# Patient Record
Sex: FEMALE | Race: WHITE | NOT HISPANIC OR LATINO | Employment: FULL TIME | URBAN - METROPOLITAN AREA
[De-identification: names, ages, dates, MRNs, and addresses within clinical notes are randomized per-mention and may not be internally consistent; named-entity substitution may affect disease eponyms.]

---

## 2023-10-12 ENCOUNTER — ANESTHESIA EVENT (OUTPATIENT)
Dept: RADIOLOGY | Facility: HOSPITAL | Age: 28
End: 2023-10-12
Payer: MEDICAID

## 2023-10-12 ENCOUNTER — ANESTHESIA (OUTPATIENT)
Dept: RADIOLOGY | Facility: HOSPITAL | Age: 28
End: 2023-10-12
Payer: MEDICAID

## 2023-10-12 ENCOUNTER — HOSPITAL ENCOUNTER (OUTPATIENT)
Dept: RADIOLOGY | Facility: HOSPITAL | Age: 28
Discharge: HOME | End: 2023-10-12
Attending: NEUROLOGICAL SURGERY
Payer: MEDICAID

## 2023-10-12 ENCOUNTER — HOSPITAL ENCOUNTER (OUTPATIENT)
Dept: RADIOLOGY | Facility: HOSPITAL | Age: 28
Discharge: HOME | End: 2023-10-12
Payer: MEDICAID

## 2023-10-12 VITALS
SYSTOLIC BLOOD PRESSURE: 117 MMHG | OXYGEN SATURATION: 100 % | TEMPERATURE: 97.9 F | HEART RATE: 77 BPM | DIASTOLIC BLOOD PRESSURE: 66 MMHG | RESPIRATION RATE: 16 BRPM

## 2023-10-12 DIAGNOSIS — G31.9 DEGENERATIVE DISEASE OF NERVOUS SYSTEM, UNSPECIFIED (CMS-HCC): ICD-10-CM

## 2023-10-12 DIAGNOSIS — G24.9 DYSTONIA, UNSPECIFIED: ICD-10-CM

## 2023-10-12 PROBLEM — F41.9 ANXIETY: Status: ACTIVE | Noted: 2023-10-12

## 2023-10-12 PROCEDURE — 70460 CT HEAD/BRAIN W/DYE: CPT

## 2023-10-12 PROCEDURE — 70553 MRI BRAIN STEM W/O & W/DYE: CPT | Performed by: RADIOLOGY

## 2023-10-12 PROCEDURE — 70450 CT HEAD/BRAIN W/O DYE: CPT | Performed by: RADIOLOGY

## 2023-10-12 PROCEDURE — 3700000001 HC GENERAL ANESTHESIA TIME - INITIAL BASE CHARGE

## 2023-10-12 PROCEDURE — 2550000001 HC RX 255 CONTRASTS: Mod: SE | Performed by: NEUROLOGICAL SURGERY

## 2023-10-12 PROCEDURE — A9575 INJ GADOTERATE MEGLUMI 0.1ML: HCPCS | Mod: SE | Performed by: NEUROLOGICAL SURGERY

## 2023-10-12 PROCEDURE — A70551 CHG MRI BRAIN: Performed by: PAIN MEDICINE

## 2023-10-12 PROCEDURE — 2500000004 HC RX 250 GENERAL PHARMACY W/ HCPCS (ALT 636 FOR OP/ED): Mod: SE | Performed by: STUDENT IN AN ORGANIZED HEALTH CARE EDUCATION/TRAINING PROGRAM

## 2023-10-12 PROCEDURE — 2580000001 HC RX 258 IV SOLUTIONS: Mod: SE | Performed by: STUDENT IN AN ORGANIZED HEALTH CARE EDUCATION/TRAINING PROGRAM

## 2023-10-12 PROCEDURE — 3700000002 HC GENERAL ANESTHESIA TIME - EACH INCREMENTAL 1 MINUTE

## 2023-10-12 PROCEDURE — 2500000005 HC RX 250 GENERAL PHARMACY W/O HCPCS: Mod: SE | Performed by: STUDENT IN AN ORGANIZED HEALTH CARE EDUCATION/TRAINING PROGRAM

## 2023-10-12 PROCEDURE — 70553 MRI BRAIN STEM W/O & W/DYE: CPT

## 2023-10-12 RX ORDER — PROPOFOL 10 MG/ML
INJECTION, EMULSION INTRAVENOUS AS NEEDED
Status: DISCONTINUED | OUTPATIENT
Start: 2023-10-12 | End: 2023-10-12

## 2023-10-12 RX ORDER — LIDOCAINE HYDROCHLORIDE 10 MG/ML
0.1 INJECTION, SOLUTION EPIDURAL; INFILTRATION; INTRACAUDAL; PERINEURAL ONCE
Status: CANCELLED | OUTPATIENT
Start: 2023-10-12 | End: 2023-10-12

## 2023-10-12 RX ORDER — SODIUM CHLORIDE, SODIUM LACTATE, POTASSIUM CHLORIDE, CALCIUM CHLORIDE 600; 310; 30; 20 MG/100ML; MG/100ML; MG/100ML; MG/100ML
100 INJECTION, SOLUTION INTRAVENOUS CONTINUOUS
Status: CANCELLED | OUTPATIENT
Start: 2023-10-12

## 2023-10-12 RX ORDER — MIDAZOLAM HYDROCHLORIDE 1 MG/ML
INJECTION INTRAMUSCULAR; INTRAVENOUS
Status: DISCONTINUED
Start: 2023-10-12 | End: 2023-10-12 | Stop reason: HOSPADM

## 2023-10-12 RX ORDER — FENTANYL CITRATE 50 UG/ML
INJECTION, SOLUTION INTRAMUSCULAR; INTRAVENOUS
Status: COMPLETED
Start: 2023-10-12 | End: 2023-10-12

## 2023-10-12 RX ORDER — FENTANYL CITRATE 50 UG/ML
INJECTION, SOLUTION INTRAMUSCULAR; INTRAVENOUS AS NEEDED
Status: DISCONTINUED | OUTPATIENT
Start: 2023-10-12 | End: 2023-10-12

## 2023-10-12 RX ORDER — PROPOFOL 10 MG/ML
INJECTION, EMULSION INTRAVENOUS CONTINUOUS PRN
Status: DISCONTINUED | OUTPATIENT
Start: 2023-10-12 | End: 2023-10-12

## 2023-10-12 RX ORDER — PROPOFOL 10 MG/ML
INJECTION, EMULSION INTRAVENOUS
Status: COMPLETED
Start: 2023-10-12 | End: 2023-10-12

## 2023-10-12 RX ORDER — GADOTERATE MEGLUMINE 376.9 MG/ML
10 INJECTION INTRAVENOUS
Status: COMPLETED | OUTPATIENT
Start: 2023-10-12 | End: 2023-10-12

## 2023-10-12 RX ORDER — MIDAZOLAM HYDROCHLORIDE 1 MG/ML
INJECTION, SOLUTION INTRAMUSCULAR; INTRAVENOUS AS NEEDED
Status: DISCONTINUED | OUTPATIENT
Start: 2023-10-12 | End: 2023-10-12

## 2023-10-12 RX ORDER — ONDANSETRON HYDROCHLORIDE 2 MG/ML
4 INJECTION, SOLUTION INTRAVENOUS ONCE AS NEEDED
Status: CANCELLED | OUTPATIENT
Start: 2023-10-12

## 2023-10-12 RX ORDER — ROCURONIUM BROMIDE 10 MG/ML
INJECTION, SOLUTION INTRAVENOUS AS NEEDED
Status: DISCONTINUED | OUTPATIENT
Start: 2023-10-12 | End: 2023-10-12

## 2023-10-12 RX ORDER — LIDOCAINE HYDROCHLORIDE 20 MG/ML
INJECTION, SOLUTION INFILTRATION; PERINEURAL AS NEEDED
Status: DISCONTINUED | OUTPATIENT
Start: 2023-10-12 | End: 2023-10-12

## 2023-10-12 RX ADMIN — SODIUM CHLORIDE, SODIUM LACTATE, POTASSIUM CHLORIDE, AND CALCIUM CHLORIDE: .6; .31; .03; .02 INJECTION, SOLUTION INTRAVENOUS at 12:10

## 2023-10-12 RX ADMIN — PROPOFOL 80 MG: 10 INJECTION, EMULSION INTRAVENOUS at 12:17

## 2023-10-12 RX ADMIN — ROCURONIUM BROMIDE 50 MG: 10 INJECTION, SOLUTION INTRAVENOUS at 12:18

## 2023-10-12 RX ADMIN — MIDAZOLAM 1 MG: 1 INJECTION INTRAMUSCULAR; INTRAVENOUS at 12:12

## 2023-10-12 RX ADMIN — GADOTERATE MEGLUMINE 8 ML: 376.9 INJECTION INTRAVENOUS at 12:55

## 2023-10-12 RX ADMIN — LIDOCAINE HYDROCHLORIDE 40 MG: 20 INJECTION, SOLUTION INFILTRATION; PERINEURAL at 12:17

## 2023-10-12 RX ADMIN — FENTANYL CITRATE 50 MCG: 50 INJECTION, SOLUTION INTRAMUSCULAR; INTRAVENOUS at 12:17

## 2023-10-12 RX ADMIN — PROPOFOL 100 MCG/KG/MIN: 10 INJECTION, EMULSION INTRAVENOUS at 13:20

## 2023-10-12 SDOH — HEALTH STABILITY: MENTAL HEALTH: CURRENT SMOKER: 0

## 2023-10-12 ASSESSMENT — PAIN SCALES - GENERAL
PAINLEVEL_OUTOF10: 0 - NO PAIN
PAIN_LEVEL: 0

## 2023-10-12 ASSESSMENT — PAIN - FUNCTIONAL ASSESSMENT
PAIN_FUNCTIONAL_ASSESSMENT: 0-10
PAIN_FUNCTIONAL_ASSESSMENT: 0-10

## 2023-10-12 NOTE — ANESTHESIA PROCEDURE NOTES
Airway  Date/Time: 10/12/2023 12:19 PM  Urgency: elective    Airway not difficult    Staffing  Performed: resident   Authorized by: Edison Tinoco MD    Performed by: Celeste Guadalupe MD  Patient location during procedure: OR    Indications and Patient Condition  Indications for airway management: anesthesia  Spontaneous Ventilation: absent  Sedation level: deep  Preoxygenated: yes  Patient position: sniffing  Mask difficulty assessment: 1 - vent by mask  Planned trial extubation    Final Airway Details  Final airway type: endotracheal airway      Successful airway: ETT  Cuffed: yes   Successful intubation technique: direct laryngoscopy  Facilitating devices/methods: intubating stylet  Endotracheal tube insertion site: oral  Blade: Michael  Blade size: #3  ETT size (mm): 7.0  Cormack-Lehane Classification: grade I - full view of glottis  Placement verified by: chest auscultation and capnometry   Measured from: lips  ETT to lips (cm): 20  Number of attempts at approach: 1  Number of other approaches attempted: 0

## 2023-10-12 NOTE — ANESTHESIA POSTPROCEDURE EVALUATION
Patient: Eloise Costa    Procedure Summary       Date: 10/12/23 Room / Location: [facility]    Anesthesia Start: 1205 Anesthesia Stop: 1410    Procedure: MR BRAIN W AND WO CONTRAST Diagnosis:       Degenerative disease of nervous system, unspecified (CMS/HCC)      (Signs/Symptoms: tremors  G31.9: Cerebellar degeneration)    Scheduled Providers:  Responsible Provider: Edison Tinoco MD    Anesthesia Type: general ASA Status: 2            Anesthesia Type: general    See [facility] PACU vitals.  Anesthesia Post Evaluation    Patient location during evaluation: PACU  Patient participation: complete - patient cannot participate  Level of consciousness: awake  Pain score: 0  Pain management: adequate  Airway patency: patent  Cardiovascular status: acceptable  Respiratory status: acceptable  Hydration status: acceptable      Alert, calm and stable.   No notable events documented.

## 2023-10-12 NOTE — SIGNIFICANT EVENT
PATIENT IS NONVERBAL DUE TO BRAIN INJURY.  BILAT UPPER EXTREMITY CONTRACTURES.  BODY TREMORS, MOST NOTABLY IN BILAT LOWER EXTREMITIES.  PATIENT IS ALERT, EYES OPEN.  SHE IS RESPONDING TO PARENTS AT BEDSIDE AND LAUGHING SOME.  PATIENT DOES NOT SHOW ANY SIGNS OF PAIN/DISCOMFORT.  NO SIGNS/SYMPTOMS OF DISTRESS NOTED.

## 2023-10-12 NOTE — SIGNIFICANT EVENT
IV REMOVED VIA PROTOCOL BY THIS RN.  NO SIGNS/SYMPTOMS OF INFILTRATION.  PATIENT TOLERATED WELL.    PATIENT WAS DRESSED BY PARENTS AT BEDSIDE AND TRANSFERRED TO WHEELCHAIR.  NO SIGNS/SYMPTOMS OF DISTRESS NOTED

## 2023-10-12 NOTE — ANESTHESIA PREPROCEDURE EVALUATION
Patient: Eloise Costa    Procedure Information       Date/Time: 10/12/23 0930    Procedure: MR BRAIN W AND WO CONTRAST    Location: [facility]            Relevant Problems   Cardiovascular (within normal limits)      Endocrine (within normal limits)      GI (within normal limits)      /Renal (within normal limits)      Neuro/Psych  dystonia   (+) Anxiety      Pulmonary (within normal limits)      GI/Hepatic (within normal limits)      Hematology (within normal limits)      Musculoskeletal  Dystonia of upper and lower extremities, contractures       Clinical information reviewed:                   NPO Detail:  NPO/Void Status  Date of Last Solid: 10/11/23  Time of Last Solid: 1900         Physical Exam    Airway  Mallampati: unable to assess  TM distance: >3 FB  Neck ROM: full     Cardiovascular   Rhythm: regular     Dental    Pulmonary   Breath sounds clear to auscultation     Abdominal            Anesthesia Plan    ASA 2     general     The patient is not a current smoker.    intravenous induction   Anesthetic plan and risks discussed with father and mother.    Plan discussed with attending.

## 2023-11-06 ENCOUNTER — TELEPHONE (OUTPATIENT)
Dept: NEUROLOGY | Facility: CLINIC | Age: 28
End: 2023-11-06
Payer: MEDICAID

## 2023-11-06 NOTE — TELEPHONE ENCOUNTER
Mom called about DBS surgical planning for pt- gave  her the update that Dr Flynn on FMLA until Feb 2024. She is approved for surgery- mentioned there was talk of getting a psyche referral so they will work on getting that scheduled.

## 2023-12-08 ENCOUNTER — TELEPHONE (OUTPATIENT)
Dept: NEUROLOGY | Facility: CLINIC | Age: 28
End: 2023-12-08
Payer: MEDICAID

## 2023-12-08 DIAGNOSIS — F54 PSYCHOLOGICAL FACTOR AFFECTING PHYSICAL CONDITION: ICD-10-CM

## 2023-12-08 DIAGNOSIS — F41.9 ANXIETY: Primary | ICD-10-CM

## 2023-12-08 RX ORDER — LORAZEPAM 1 MG/1
1 TABLET ORAL ONCE
Status: CANCELLED | OUTPATIENT
Start: 2023-12-08

## 2023-12-08 RX ORDER — LORAZEPAM 1 MG/1
0.5 TABLET ORAL 2 TIMES DAILY
Qty: 90 TABLET | Refills: 0 | Status: SHIPPED | OUTPATIENT
Start: 2023-12-08 | End: 2024-04-09 | Stop reason: SDUPTHER

## 2023-12-08 NOTE — TELEPHONE ENCOUNTER
I do not see benzo agreement on file, Alin, have you seen it?    If not, she will need an apt in person just so her mother can sign (new rules that we cannot mail the agreement and have to review with them in person). Not sure if we would be able to get UA but can discuss.  Thanks    I will send lorazepam for now. Thanks!

## 2024-01-02 ENCOUNTER — TELEPHONE (OUTPATIENT)
Dept: NEUROSURGERY | Facility: HOSPITAL | Age: 29
End: 2024-01-02
Payer: MEDICAID

## 2024-01-02 NOTE — TELEPHONE ENCOUNTER
I called and spoke to Bouchra(patients mother) to discuss DBS surgery planning. Mother verbalize understanding and agrees to plan. Mother aware of pre-admission testing needed prior to surgery. Mother will call if she has any questions.

## 2024-01-03 ENCOUNTER — PREP FOR PROCEDURE (OUTPATIENT)
Dept: NEUROSURGERY | Facility: HOSPITAL | Age: 29
End: 2024-01-03
Payer: MEDICAID

## 2024-01-03 DIAGNOSIS — G24.9 DYSTONIA: Primary | ICD-10-CM

## 2024-01-30 ENCOUNTER — PREP FOR PROCEDURE (OUTPATIENT)
Dept: NEUROSURGERY | Facility: HOSPITAL | Age: 29
End: 2024-01-30
Payer: MEDICAID

## 2024-02-08 ENCOUNTER — TELEMEDICINE CLINICAL SUPPORT (OUTPATIENT)
Dept: PREADMISSION TESTING | Facility: HOSPITAL | Age: 29
End: 2024-02-08
Payer: MEDICAID

## 2024-02-08 DIAGNOSIS — G24.9 DYSTONIA: ICD-10-CM

## 2024-02-08 RX ORDER — BACLOFEN 10 MG/1
1.5 TABLET ORAL 3 TIMES DAILY
COMMUNITY
Start: 2017-04-13

## 2024-02-08 RX ORDER — DOCUSATE SODIUM 100 MG/1
100 CAPSULE, LIQUID FILLED ORAL 2 TIMES DAILY
COMMUNITY

## 2024-02-08 RX ORDER — CARBIDOPA AND LEVODOPA 25; 100 MG/1; MG/1
1 TABLET, EXTENDED RELEASE ORAL 4 TIMES DAILY
COMMUNITY
Start: 2018-10-18

## 2024-02-13 ENCOUNTER — LAB (OUTPATIENT)
Dept: LAB | Facility: LAB | Age: 29
End: 2024-02-13
Payer: MEDICAID

## 2024-02-13 ENCOUNTER — PRE-ADMISSION TESTING (OUTPATIENT)
Dept: PREADMISSION TESTING | Facility: HOSPITAL | Age: 29
End: 2024-02-13
Payer: MEDICAID

## 2024-02-13 VITALS
OXYGEN SATURATION: 99 % | BODY MASS INDEX: 16.22 KG/M2 | DIASTOLIC BLOOD PRESSURE: 66 MMHG | HEART RATE: 77 BPM | WEIGHT: 95 LBS | SYSTOLIC BLOOD PRESSURE: 105 MMHG | TEMPERATURE: 97.9 F | HEIGHT: 64 IN

## 2024-02-13 DIAGNOSIS — G24.9 DYSTONIA: Primary | ICD-10-CM

## 2024-02-13 DIAGNOSIS — Z01.818 PREPROCEDURAL EXAMINATION: ICD-10-CM

## 2024-02-13 LAB
ABO GROUP (TYPE) IN BLOOD: NORMAL
ALBUMIN SERPL BCP-MCNC: 4.3 G/DL (ref 3.4–5)
ALP SERPL-CCNC: 84 U/L (ref 33–110)
ALT SERPL W P-5'-P-CCNC: 12 U/L (ref 7–45)
ANION GAP SERPL CALC-SCNC: 14 MMOL/L (ref 10–20)
ANTIBODY SCREEN: NORMAL
AST SERPL W P-5'-P-CCNC: 19 U/L (ref 9–39)
BASOPHILS # BLD AUTO: 0.06 X10*3/UL (ref 0–0.1)
BASOPHILS NFR BLD AUTO: 0.8 %
BILIRUB SERPL-MCNC: 0.4 MG/DL (ref 0–1.2)
BUN SERPL-MCNC: 11 MG/DL (ref 6–23)
CALCIUM SERPL-MCNC: 9.1 MG/DL (ref 8.6–10.3)
CHLORIDE SERPL-SCNC: 102 MMOL/L (ref 98–107)
CO2 SERPL-SCNC: 26 MMOL/L (ref 21–32)
CREAT SERPL-MCNC: 0.5 MG/DL (ref 0.5–1.05)
EGFRCR SERPLBLD CKD-EPI 2021: >90 ML/MIN/1.73M*2
EOSINOPHIL # BLD AUTO: 0.53 X10*3/UL (ref 0–0.7)
EOSINOPHIL NFR BLD AUTO: 7.4 %
ERYTHROCYTE [DISTWIDTH] IN BLOOD BY AUTOMATED COUNT: 11.7 % (ref 11.5–14.5)
GLUCOSE SERPL-MCNC: 88 MG/DL (ref 74–99)
HCT VFR BLD AUTO: 40.4 % (ref 36–46)
HGB BLD-MCNC: 13.6 G/DL (ref 12–16)
IMM GRANULOCYTES # BLD AUTO: 0.01 X10*3/UL (ref 0–0.7)
IMM GRANULOCYTES NFR BLD AUTO: 0.1 % (ref 0–0.9)
INR PPP: 1.1 (ref 0.9–1.1)
LYMPHOCYTES # BLD AUTO: 2.1 X10*3/UL (ref 1.2–4.8)
LYMPHOCYTES NFR BLD AUTO: 29.4 %
MCH RBC QN AUTO: 31.8 PG (ref 26–34)
MCHC RBC AUTO-ENTMCNC: 33.7 G/DL (ref 32–36)
MCV RBC AUTO: 94 FL (ref 80–100)
MONOCYTES # BLD AUTO: 0.56 X10*3/UL (ref 0.1–1)
MONOCYTES NFR BLD AUTO: 7.8 %
NEUTROPHILS # BLD AUTO: 3.89 X10*3/UL (ref 1.2–7.7)
NEUTROPHILS NFR BLD AUTO: 54.5 %
NRBC BLD-RTO: 0 /100 WBCS (ref 0–0)
PLATELET # BLD AUTO: 212 X10*3/UL (ref 150–450)
POTASSIUM SERPL-SCNC: 4.1 MMOL/L (ref 3.5–5.3)
PROT SERPL-MCNC: 7.2 G/DL (ref 6.4–8.2)
PROTHROMBIN TIME: 12 SECONDS (ref 9.8–12.8)
RBC # BLD AUTO: 4.28 X10*6/UL (ref 4–5.2)
RH FACTOR (ANTIGEN D): NORMAL
SODIUM SERPL-SCNC: 138 MMOL/L (ref 136–145)
WBC # BLD AUTO: 7.2 X10*3/UL (ref 4.4–11.3)

## 2024-02-13 PROCEDURE — 36415 COLL VENOUS BLD VENIPUNCTURE: CPT

## 2024-02-13 PROCEDURE — 86900 BLOOD TYPING SEROLOGIC ABO: CPT

## 2024-02-13 PROCEDURE — 86850 RBC ANTIBODY SCREEN: CPT

## 2024-02-13 PROCEDURE — 99204 OFFICE O/P NEW MOD 45 MIN: CPT | Performed by: NURSE PRACTITIONER

## 2024-02-13 PROCEDURE — 86901 BLOOD TYPING SEROLOGIC RH(D): CPT

## 2024-02-13 PROCEDURE — 80053 COMPREHEN METABOLIC PANEL: CPT

## 2024-02-13 PROCEDURE — 85610 PROTHROMBIN TIME: CPT

## 2024-02-13 PROCEDURE — 85025 COMPLETE CBC W/AUTO DIFF WBC: CPT

## 2024-02-13 PROCEDURE — 87081 CULTURE SCREEN ONLY: CPT | Mod: AHULAB | Performed by: NURSE PRACTITIONER

## 2024-02-13 RX ORDER — CHLORHEXIDINE GLUCONATE ORAL RINSE 1.2 MG/ML
15 SOLUTION DENTAL DAILY
Qty: 30 ML | Refills: 0 | Status: SHIPPED | OUTPATIENT
Start: 2024-02-13 | End: 2024-02-23 | Stop reason: HOSPADM

## 2024-02-13 ASSESSMENT — ENCOUNTER SYMPTOMS
CONSTITUTIONAL NEGATIVE: 1
CARDIOVASCULAR NEGATIVE: 1
LIMITED RANGE OF MOTION: 1
CONSTIPATION: 1
NECK STIFFNESS: 1
RESPIRATORY NEGATIVE: 1

## 2024-02-13 NOTE — CPM/PAT H&P
CPM/PAT Evaluation       Name: Eloise Costa (Eloise Costa)  /Age: 1995/ y.o.     SURGEON :DR MI PARKER     Surgery, Date, and Length:  Bilateral Globus Pallidus Internus Deep Brain Stimulator Leads & Generator Placement , 24  HPI:  This a  28y.o. fe-male who presents for presurgical evaluation for for above mentioned procedure. This patient has neurogenerative disorder . She has     Dystonia . After discussion of the risks and benefits with Dr. PARKER the patient elects to proceed with the planned procedure.       Past Medical History:   Diagnosis Date    ADHD     Anxiety     Cerebellar degeneration (CMS/HCC)     Cognitive decline     Dystonia     Nonverbal     Panic attacks     Patient underweight     Seizure (CMS/HCC)     grand mal    Systolic hypertension in child        Past Surgical History:   Procedure Laterality Date    CT HEAD WO IV CONTRAST  2023    Under anesthesia    MR BRAIN W AND WO CONTRAST  2023    Under anesthesia    NO PAST SURGERIES       Anesthesia History  Pt denies any past history of anesthetic complications such as PONV, awareness, prolonged sedation, dental damage, aspiration, cardiac arrest, difficult intubation, difficult I.V. access or unexpected hospital admissions.  NO malignant hyperthermia and or pseudo cholinesterase deficiency.    The patient is not  a Orthodox and will accept blood and blood products if medically indicated.   No history of blood transfusions .Type and screen  sent.     Social History  Social History     Substance and Sexual Activity   Drug Use Never      Social History     Substance and Sexual Activity   Alcohol Use Never      Social History     Tobacco Use   Smoking Status Never   Smokeless Tobacco Never          Family History   Problem Relation Name Age of Onset    No Known Problems Mother      Hyperlipidemia Father      Hyperlipidemia Sister         No Known Allergies    Prior to Admission medications     Medication Sig Start Date End Date Taking? Authorizing Provider   baclofen (Lioresal) 10 mg tablet Take 1.5 tablets (15 mg) by mouth 3 times a day. 4/13/17   Historical Provider, MD   carbidopa-levodopa (Sinemet CR)  mg ER tablet Take 0.5 tablets by mouth 4 times a day. 10/18/18   Historical Provider, MD   chlorhexidine (Peridex) 0.12 % solution Use 15 mL in the mouth or throat once daily for 2 days. 2/13/24 2/15/24  Radha Damian APRN-CNP   docusate sodium (Colace) 100 mg capsule Take 1 capsule (100 mg) by mouth 2 times a day.    Historical Provider, MD   LORazepam (Ativan) 1 mg tablet Take 0.5 tablets (0.5 mg) by mouth 2 times a day. 12/8/23   JULIETTE Dalton-CNP        PAT ROS:   Constitutional:   neg    Neuro/Psych:   Eyes:   Ears:   Nose:   Mouth:   neg    Throat:   neg    Neck:    neck stiffness  Cardio:   neg    Respiratory:   neg    Endocrine:   GI:    constipation  :   neg    Musculoskeletal:    decreased ROM  Hematologic:   neg    Skin:  neg        Physical Exam  Vitals reviewed.   Constitutional:       Comments: GROSSLY UNDERWEIGHT   HENT:      Head: Normocephalic and atraumatic.      Mouth/Throat:      Mouth: Mucous membranes are moist.   Eyes:      Comments: Unable to elicit eom .    Cardiovascular:      Rate and Rhythm: Normal rate and regular rhythm.      Pulses: Normal pulses.      Heart sounds: Normal heart sounds.   Pulmonary:      Effort: Pulmonary effort is normal.      Breath sounds: Normal breath sounds.   Musculoskeletal:      Cervical back: Normal range of motion.      Comments: B/l arms contracted at elbows . B/l hand s mild contractures    Skin:     General: Skin is warm and dry.   Neurological:      Mental Status: She is alert. She is disoriented.      Comments: Unable to assess orientation or mood. Pt is nonverbal    Psychiatric:      Comments: Unable to assess orientation or mood. Pt is nonverbal           PAT AIRWAY:   Airway:     Mallampati::  Unable to  "assess    /66   Pulse 77   Temp 36.6 °C (97.9 °F)   Ht 1.626 m (5' 4\") Comment: Pt is unable to stand on scale per parents pt is 5'4  Wt (!) 43.1 kg (95 lb) Comment: Pt is unable to stand per parents wt is about 95 lbs  SpO2 99%   BMI 16.31 kg/m²     Lab Results   Component Value Date    WBC 7.2 02/13/2024    HGB 13.6 02/13/2024    HCT 40.4 02/13/2024    MCV 94 02/13/2024     02/13/2024     Results from last 7 days   Lab Units 02/13/24  1506   SODIUM mmol/L 138   POTASSIUM mmol/L 4.1   CHLORIDE mmol/L 102   CO2 mmol/L 26   BUN mg/dL 11   CREATININE mg/dL 0.50   CALCIUM mg/dL 9.1   PROTEIN TOTAL g/dL 7.2   BILIRUBIN TOTAL mg/dL 0.4   ALK PHOS U/L 84   ALT U/L 12   AST U/L 19   GLUCOSE mg/dL 88     PT/INR: 12/1.1    ASSESSMENT/PLAN    Patient is a 28 year-old  scheduled for Bilateral Globus Pallidus Internus Deep Brain Stimulator Leads & Generator Placement  with Dr. PARKER   on  2/22/24 .  CARDIOVASCULAR:  RCRI score / Risk: The patients score is 0 based on history . Per ACC/AHA guidelines this places her  at  3.9 % risk for MACE undergoing a intermediate  risk procedure . The patient has the following risk factors: denies   Functional Capacity: The patients exercise tolerance is  1  METS. This is based on the patients patient has neurodegenerative disorder is w/c bound . Patient family denies  active cardiac symptoms or anginal equivalents .      PULMONARY:  The patient has the following factors that place them at increased risk of perioperative pulmonary complications;age greater than 65/BMI greater than 27/poor functional status/greater than 2.5 hour procedure.  Postoperatively the patient would benefit from early pulmonary toilet/incentive spirometry q 1-2 hours while awake/pulse oximetry/cautious use of respiratory depressant medications such as opioids/elevate the HOB/oral hygiene.    SEIZURE :   Pt had 1 seizure 9/11/2021. To ER in [place]. MRI  done, no EEG. Pt was prescribed ativan " 0.5mg BID. Neurologist in [place] did not change medication . Unclear etiology , no precipitating event . No further seizure like activity since 2021.   Recommendations: Monitor for post procedure breakthrough seizure. Administer prescribed anti seizure medications as directed.Implement seizure precautions , Have available suction and supplemental oxygen      DVT:  CAPRINI SCORE=7  The patient has the following factors that increase her  Risk for thrombus formation ; Virchow's triad ,immobility Surgical procedure >6 hrs  procedure .    Recommendations: DVT prophylaxis  per Dr. Flynn  protocol . SCD's, HELIO's, and early ambulation are recommended. Heparin or LMWH is recommended for the very high risk .      Risk assessment complete.  Patient is scheduled for  intermediate  surgical risk procedure.  Patient is considered an INCREASED NOT PROHIBITIVE  risk to proceed with the planned procedure.      Preoperative medication instructions were provided and reviewed with the patient.  Any additional testing or evaluation was explained to the patient.  Nothing by mouth instructions were discussed and patient's questions were answered prior to conclusion to this encounter.  Patient verbalized understanding of preoperative instructions given in preadmission testing; discharge instructions available in EMR.

## 2024-02-13 NOTE — H&P (VIEW-ONLY)
CPM/PAT Evaluation       Name: Eloise Costa (Eloise Costa)  /Age: 1995/ y.o.     SURGEON :DR MI PARKER     Surgery, Date, and Length:  Bilateral Globus Pallidus Internus Deep Brain Stimulator Leads & Generator Placement , 24  HPI:  This a  28y.o. fe-male who presents for presurgical evaluation for for above mentioned procedure. This patient has neurogenerative disorder . She has     Dystonia . After discussion of the risks and benefits with Dr. PARKER the patient elects to proceed with the planned procedure.       Past Medical History:   Diagnosis Date    ADHD     Anxiety     Cerebellar degeneration (CMS/HCC)     Cognitive decline     Dystonia     Nonverbal     Panic attacks     Patient underweight     Seizure (CMS/HCC)     grand mal    Systolic hypertension in child        Past Surgical History:   Procedure Laterality Date    CT HEAD WO IV CONTRAST  2023    Under anesthesia    MR BRAIN W AND WO CONTRAST  2023    Under anesthesia    NO PAST SURGERIES       Anesthesia History  Pt denies any past history of anesthetic complications such as PONV, awareness, prolonged sedation, dental damage, aspiration, cardiac arrest, difficult intubation, difficult I.V. access or unexpected hospital admissions.  NO malignant hyperthermia and or pseudo cholinesterase deficiency.    The patient is not  a Y[employer]anism and will accept blood and blood products if medically indicated.   No history of blood transfusions .Type and screen  sent.     Social History  Social History     Substance and Sexual Activity   Drug Use Never      Social History     Substance and Sexual Activity   Alcohol Use Never      Social History     Tobacco Use   Smoking Status Never   Smokeless Tobacco Never          Family History   Problem Relation Name Age of Onset    No Known Problems Mother      Hyperlipidemia Father      Hyperlipidemia Sister         No Known Allergies    Prior to Admission medications     Medication Sig Start Date End Date Taking? Authorizing Provider   baclofen (Lioresal) 10 mg tablet Take 1.5 tablets (15 mg) by mouth 3 times a day. 4/13/17   Historical Provider, MD   carbidopa-levodopa (Sinemet CR)  mg ER tablet Take 0.5 tablets by mouth 4 times a day. 10/18/18   Historical Provider, MD   chlorhexidine (Peridex) 0.12 % solution Use 15 mL in the mouth or throat once daily for 2 days. 2/13/24 2/15/24  Radha Damian APRN-CNP   docusate sodium (Colace) 100 mg capsule Take 1 capsule (100 mg) by mouth 2 times a day.    Historical Provider, MD   LORazepam (Ativan) 1 mg tablet Take 0.5 tablets (0.5 mg) by mouth 2 times a day. 12/8/23   JULIETTE Dalton-CNP        PAT ROS:   Constitutional:   neg    Neuro/Psych:   Eyes:   Ears:   Nose:   Mouth:   neg    Throat:   neg    Neck:    neck stiffness  Cardio:   neg    Respiratory:   neg    Endocrine:   GI:    constipation  :   neg    Musculoskeletal:    decreased ROM  Hematologic:   neg    Skin:  neg        Physical Exam  Vitals reviewed.   Constitutional:       Comments: GROSSLY UNDERWEIGHT   HENT:      Head: Normocephalic and atraumatic.      Mouth/Throat:      Mouth: Mucous membranes are moist.   Eyes:      Comments: Unable to elicit eom .    Cardiovascular:      Rate and Rhythm: Normal rate and regular rhythm.      Pulses: Normal pulses.      Heart sounds: Normal heart sounds.   Pulmonary:      Effort: Pulmonary effort is normal.      Breath sounds: Normal breath sounds.   Musculoskeletal:      Cervical back: Normal range of motion.      Comments: B/l arms contracted at elbows . B/l hand s mild contractures    Skin:     General: Skin is warm and dry.   Neurological:      Mental Status: She is alert. She is disoriented.      Comments: Unable to assess orientation or mood. Pt is nonverbal    Psychiatric:      Comments: Unable to assess orientation or mood. Pt is nonverbal           PAT AIRWAY:   Airway:     Mallampati::  Unable to  "assess    /66   Pulse 77   Temp 36.6 °C (97.9 °F)   Ht 1.626 m (5' 4\") Comment: Pt is unable to stand on scale per parents pt is 5'4  Wt (!) 43.1 kg (95 lb) Comment: Pt is unable to stand per parents wt is about 95 lbs  SpO2 99%   BMI 16.31 kg/m²     Lab Results   Component Value Date    WBC 7.2 02/13/2024    HGB 13.6 02/13/2024    HCT 40.4 02/13/2024    MCV 94 02/13/2024     02/13/2024     Results from last 7 days   Lab Units 02/13/24  1506   SODIUM mmol/L 138   POTASSIUM mmol/L 4.1   CHLORIDE mmol/L 102   CO2 mmol/L 26   BUN mg/dL 11   CREATININE mg/dL 0.50   CALCIUM mg/dL 9.1   PROTEIN TOTAL g/dL 7.2   BILIRUBIN TOTAL mg/dL 0.4   ALK PHOS U/L 84   ALT U/L 12   AST U/L 19   GLUCOSE mg/dL 88     PT/INR: 12/1.1    ASSESSMENT/PLAN    Patient is a 28 year-old  scheduled for Bilateral Globus Pallidus Internus Deep Brain Stimulator Leads & Generator Placement  with Dr. PARKER   on  2/22/24 .  CARDIOVASCULAR:  RCRI score / Risk: The patients score is 0 based on history . Per ACC/AHA guidelines this places her  at  3.9 % risk for MACE undergoing a intermediate  risk procedure . The patient has the following risk factors: denies   Functional Capacity: The patients exercise tolerance is  1  METS. This is based on the patients patient has neurodegenerative disorder is w/c bound . Patient family denies  active cardiac symptoms or anginal equivalents .      PULMONARY:  The patient has the following factors that place them at increased risk of perioperative pulmonary complications;age greater than 65/BMI greater than 27/poor functional status/greater than 2.5 hour procedure.  Postoperatively the patient would benefit from early pulmonary toilet/incentive spirometry q 1-2 hours while awake/pulse oximetry/cautious use of respiratory depressant medications such as opioids/elevate the HOB/oral hygiene.    SEIZURE :   Pt had 1 seizure 9/11/2021. To ER in [place]. MRI  done, no EEG. Pt was prescribed ativan " 0.5mg BID. Neurologist in [place] did not change medication . Unclear etiology , no precipitating event . No further seizure like activity since 2021.   Recommendations: Monitor for post procedure breakthrough seizure. Administer prescribed anti seizure medications as directed.Implement seizure precautions , Have available suction and supplemental oxygen      DVT:  CAPRINI SCORE=7  The patient has the following factors that increase her  Risk for thrombus formation ; Virchow's triad ,immobility Surgical procedure >6 hrs  procedure .    Recommendations: DVT prophylaxis  per Dr. Flynn  protocol . SCD's, HELIO's, and early ambulation are recommended. Heparin or LMWH is recommended for the very high risk .      Risk assessment complete.  Patient is scheduled for  intermediate  surgical risk procedure.  Patient is considered an INCREASED NOT PROHIBITIVE  risk to proceed with the planned procedure.      Preoperative medication instructions were provided and reviewed with the patient.  Any additional testing or evaluation was explained to the patient.  Nothing by mouth instructions were discussed and patient's questions were answered prior to conclusion to this encounter.  Patient verbalized understanding of preoperative instructions given in preadmission testing; discharge instructions available in EMR.

## 2024-02-13 NOTE — PREPROCEDURE INSTRUCTIONS
Medication List            Accurate as of February 13, 2024  2:19 PM. Always use your most recent med list.                baclofen 10 mg tablet  Commonly known as: Lioresal  Medication Adjustments for Surgery: Take morning of surgery with sip of water, no other fluids     carbidopa-levodopa  mg ER tablet  Commonly known as: Sinemet CR  Medication Adjustments for Surgery: Take morning of surgery with sip of water, no other fluids     chlorhexidine 0.12 % solution  Commonly known as: Peridex  Use 15 mL in the mouth or throat once daily for 2 days.  Notes to patient: Use as instructed      docusate sodium 100 mg capsule  Commonly known as: Colace  Medication Adjustments for Surgery: Continue until night before surgery     LORazepam 1 mg tablet  Commonly known as: Ativan  Take 0.5 tablets (0.5 mg) by mouth 2 times a day.  Medication Adjustments for Surgery: Take morning of surgery with sip of water, no other fluids                 CONTACT SURGEON'S OFFICE IF YOU DEVELOP:  * Fever = 100.4 F   * New respiratory symptoms (e.g. cough, shortness of breath, respiratory distress, sore throat)  * Recent loss of taste or smell  *Flu like symptoms such as headache, fatigue or gastrointestinal symptoms  * You develop any open sores, shingles, burning or painful urination   AND/OR:  * You no longer wish to have the surgery.  * Any other personal circumstances change that may lead to the need to cancel or defer this surgery.  *You were admitted to any hospital within one week of your planned procedure.    SMOKING:  *Quitting smoking can make a huge difference to your health and recovery from surgery.    *If you need help with quitting, call [phone]-NOW.    THE DAY BEFORE SURGERY:  *Do not eat any food after midnight the night before surgery.   *You are permitted to drink clear liquids (i.e. water, black coffee, tea, clear broth, apple juice) up to 2 hours before your surgery.  DIABETICS:  Please check fasting blood  sugar  upon waking up.  If fasting sugar is <80 mg/dl, please drink 100ml/3oz of apple juice no later than 2 hours prior to surgery.      SURGICAL TIME  *You will be contacted between 2 p.m. and 6 p.m. the business day before your surgery with your arrival time.  *If you haven't received a call by 6pm, call [phone].  *Scheduled surgery times may change and you will be notified if this occurs-check your personal voicemail for any updates.    ON THE MORNING OF SURGERY:  *Wear comfortable, loose fitting clothing.   *Do not use moisturizers, creams, lotions or perfume.  *All jewelry and valuables should be left at home.  *Prosthetic devices such as contact lenses, hearing aids, dentures, eyelash extensions, hairpins and body piercing must be removed before surgery.    BRING WITH YOU:  *Photo ID and insurance card  *Current list of medicines and allergies  *Pacemaker/Defibrillator/Heart stent cards  *CPAP machine and mask  *Slings/splints/crutches  *Copy of your complete Advanced Directive/DHPOA-if applicable  *Neurostimulator implant remote    PARKING AND ARRIVAL:  *Check in at the Main Entrance desk and let them know you are here for surgery.  *You will be directed to the 2nd floor surgical waiting area.    AFTER OUTPATIENT SURGERY:  *A responsible adult MUST accompany you at the time of discharge and stay with you for 24 hours after your surgery.  *You may NOT drive yourself home after surgery.  *You may use a taxi or ride sharing service ([employer], [employer]) to return home ONLY if you are accompanied by a friend or family member.  *Instructions for resuming your medications will be provided by your surgeon.    Social History  Social History     Substance and Sexual Activity   Drug Use Never      Social History     Substance and Sexual Activity   Alcohol Use Never      Social History     Tobacco Use   Smoking Status Never   Smokeless Tobacco Never

## 2024-02-15 LAB — STAPHYLOCOCCUS SPEC CULT: NORMAL

## 2024-02-21 ENCOUNTER — ANESTHESIA EVENT (OUTPATIENT)
Dept: OPERATING ROOM | Facility: HOSPITAL | Age: 29
End: 2024-02-21
Payer: MEDICAID

## 2024-02-22 ENCOUNTER — ANESTHESIA (OUTPATIENT)
Dept: OPERATING ROOM | Facility: HOSPITAL | Age: 29
End: 2024-02-22
Payer: MEDICAID

## 2024-02-22 ENCOUNTER — APPOINTMENT (OUTPATIENT)
Dept: RADIOLOGY | Facility: HOSPITAL | Age: 29
End: 2024-02-22
Payer: MEDICAID

## 2024-02-22 ENCOUNTER — HOSPITAL ENCOUNTER (INPATIENT)
Facility: HOSPITAL | Age: 29
LOS: 1 days | Discharge: HOME | End: 2024-02-23
Attending: NEUROLOGICAL SURGERY | Admitting: INTERNAL MEDICINE
Payer: MEDICAID

## 2024-02-22 DIAGNOSIS — R56.9 SEIZURE (MULTI): Primary | ICD-10-CM

## 2024-02-22 DIAGNOSIS — G89.18 ACUTE POST-OPERATIVE PAIN: ICD-10-CM

## 2024-02-22 DIAGNOSIS — R56.9 SEIZURES (MULTI): ICD-10-CM

## 2024-02-22 DIAGNOSIS — G24.9 DYSTONIA: ICD-10-CM

## 2024-02-22 PROBLEM — I10 HTN (HYPERTENSION): Status: ACTIVE | Noted: 2024-02-22

## 2024-02-22 PROBLEM — F90.9 ADHD: Status: ACTIVE | Noted: 2024-02-22

## 2024-02-22 LAB
ALBUMIN SERPL BCP-MCNC: 3.9 G/DL (ref 3.4–5)
ANION GAP SERPL CALC-SCNC: 12 MMOL/L (ref 10–20)
BASOPHILS # BLD AUTO: 0.01 X10*3/UL (ref 0–0.1)
BASOPHILS NFR BLD AUTO: 0.1 %
BUN SERPL-MCNC: 12 MG/DL (ref 6–23)
CALCIUM SERPL-MCNC: 8.9 MG/DL (ref 8.6–10.3)
CHLORIDE SERPL-SCNC: 103 MMOL/L (ref 98–107)
CO2 SERPL-SCNC: 25 MMOL/L (ref 21–32)
CREAT SERPL-MCNC: 0.56 MG/DL (ref 0.5–1.05)
EGFRCR SERPLBLD CKD-EPI 2021: >90 ML/MIN/1.73M*2
EOSINOPHIL # BLD AUTO: 0 X10*3/UL (ref 0–0.7)
EOSINOPHIL NFR BLD AUTO: 0 %
ERYTHROCYTE [DISTWIDTH] IN BLOOD BY AUTOMATED COUNT: 11.5 % (ref 11.5–14.5)
GLUCOSE BLD MANUAL STRIP-MCNC: 142 MG/DL (ref 74–99)
GLUCOSE BLD MANUAL STRIP-MCNC: 160 MG/DL (ref 74–99)
GLUCOSE SERPL-MCNC: 122 MG/DL (ref 74–99)
HCT VFR BLD AUTO: 35.3 % (ref 36–46)
HGB BLD-MCNC: 11.8 G/DL (ref 12–16)
IMM GRANULOCYTES # BLD AUTO: 0.05 X10*3/UL (ref 0–0.7)
IMM GRANULOCYTES NFR BLD AUTO: 0.3 % (ref 0–0.9)
LACTATE SERPL-SCNC: 1.2 MMOL/L (ref 0.4–2)
LYMPHOCYTES # BLD AUTO: 0.96 X10*3/UL (ref 1.2–4.8)
LYMPHOCYTES NFR BLD AUTO: 6.3 %
MAGNESIUM SERPL-MCNC: 1.7 MG/DL (ref 1.6–2.4)
MCH RBC QN AUTO: 32.8 PG (ref 26–34)
MCHC RBC AUTO-ENTMCNC: 33.4 G/DL (ref 32–36)
MCV RBC AUTO: 98 FL (ref 80–100)
MONOCYTES # BLD AUTO: 0.8 X10*3/UL (ref 0.1–1)
MONOCYTES NFR BLD AUTO: 5.3 %
NEUTROPHILS # BLD AUTO: 13.34 X10*3/UL (ref 1.2–7.7)
NEUTROPHILS NFR BLD AUTO: 88 %
NRBC BLD-RTO: 0 /100 WBCS (ref 0–0)
PHOSPHATE SERPL-MCNC: 2.4 MG/DL (ref 2.5–4.9)
PLATELET # BLD AUTO: 201 X10*3/UL (ref 150–450)
POTASSIUM SERPL-SCNC: 4.2 MMOL/L (ref 3.5–5.3)
RBC # BLD AUTO: 3.6 X10*6/UL (ref 4–5.2)
SODIUM SERPL-SCNC: 136 MMOL/L (ref 136–145)
WBC # BLD AUTO: 15.2 X10*3/UL (ref 4.4–11.3)

## 2024-02-22 PROCEDURE — 7100000001 HC RECOVERY ROOM TIME - INITIAL BASE CHARGE: Performed by: NEUROLOGICAL SURGERY

## 2024-02-22 PROCEDURE — 82947 ASSAY GLUCOSE BLOOD QUANT: CPT

## 2024-02-22 PROCEDURE — 2500000001 HC RX 250 WO HCPCS SELF ADMINISTERED DRUGS (ALT 637 FOR MEDICARE OP): Performed by: NURSE PRACTITIONER

## 2024-02-22 PROCEDURE — 0JH60DZ INSERTION OF MULTIPLE ARRAY STIMULATOR GENERATOR INTO CHEST SUBCUTANEOUS TISSUE AND FASCIA, OPEN APPROACH: ICD-10-PCS | Performed by: NEUROLOGICAL SURGERY

## 2024-02-22 PROCEDURE — 7100000002 HC RECOVERY ROOM TIME - EACH INCREMENTAL 1 MINUTE: Performed by: NEUROLOGICAL SURGERY

## 2024-02-22 PROCEDURE — 61863 IMPLANT NEUROELECTRODE: CPT | Performed by: NEUROLOGICAL SURGERY

## 2024-02-22 PROCEDURE — 36415 COLL VENOUS BLD VENIPUNCTURE: CPT | Performed by: NURSE PRACTITIONER

## 2024-02-22 PROCEDURE — 3700000002 HC GENERAL ANESTHESIA TIME - EACH INCREMENTAL 1 MINUTE: Performed by: NEUROLOGICAL SURGERY

## 2024-02-22 PROCEDURE — C1778 LEAD, NEUROSTIMULATOR: HCPCS | Performed by: NEUROLOGICAL SURGERY

## 2024-02-22 PROCEDURE — 80069 RENAL FUNCTION PANEL: CPT | Performed by: NURSE PRACTITIONER

## 2024-02-22 PROCEDURE — 85025 COMPLETE CBC W/AUTO DIFF WBC: CPT | Performed by: NURSE PRACTITIONER

## 2024-02-22 PROCEDURE — A61863 PR IMPLANT NEUROELECTRODE W/O RECORDING: Performed by: ANESTHESIOLOGY

## 2024-02-22 PROCEDURE — 70460 CT HEAD/BRAIN W/DYE: CPT

## 2024-02-22 PROCEDURE — C1713 ANCHOR/SCREW BN/BN,TIS/BN: HCPCS | Performed by: NEUROLOGICAL SURGERY

## 2024-02-22 PROCEDURE — A61863 PR IMPLANT NEUROELECTRODE W/O RECORDING: Performed by: NURSE ANESTHETIST, CERTIFIED REGISTERED

## 2024-02-22 PROCEDURE — 3600000010 HC OR TIME - EACH INCREMENTAL 1 MINUTE - PROCEDURE LEVEL FIVE: Performed by: NEUROLOGICAL SURGERY

## 2024-02-22 PROCEDURE — 3600000005 HC OR TIME - INITIAL BASE CHARGE - PROCEDURE LEVEL FIVE: Performed by: NEUROLOGICAL SURGERY

## 2024-02-22 PROCEDURE — 83735 ASSAY OF MAGNESIUM: CPT | Performed by: NURSE PRACTITIONER

## 2024-02-22 PROCEDURE — 2500000005 HC RX 250 GENERAL PHARMACY W/O HCPCS: Performed by: NURSE ANESTHETIST, CERTIFIED REGISTERED

## 2024-02-22 PROCEDURE — 2500000004 HC RX 250 GENERAL PHARMACY W/ HCPCS (ALT 636 FOR OP/ED): Performed by: NURSE ANESTHETIST, CERTIFIED REGISTERED

## 2024-02-22 PROCEDURE — A4217 STERILE WATER/SALINE, 500 ML: HCPCS | Performed by: NEUROLOGICAL SURGERY

## 2024-02-22 PROCEDURE — 2720000007 HC OR 272 NO HCPCS: Performed by: NEUROLOGICAL SURGERY

## 2024-02-22 PROCEDURE — 99223 1ST HOSP IP/OBS HIGH 75: CPT | Performed by: NURSE PRACTITIONER

## 2024-02-22 PROCEDURE — 2500000004 HC RX 250 GENERAL PHARMACY W/ HCPCS (ALT 636 FOR OP/ED): Performed by: NEUROLOGICAL SURGERY

## 2024-02-22 PROCEDURE — 2780000003 HC OR 278 NO HCPCS: Performed by: NEUROLOGICAL SURGERY

## 2024-02-22 PROCEDURE — 3700000001 HC GENERAL ANESTHESIA TIME - INITIAL BASE CHARGE: Performed by: NEUROLOGICAL SURGERY

## 2024-02-22 PROCEDURE — 2500000004 HC RX 250 GENERAL PHARMACY W/ HCPCS (ALT 636 FOR OP/ED): Performed by: ANESTHESIOLOGY

## 2024-02-22 PROCEDURE — C1767 GENERATOR, NEURO NON-RECHARG: HCPCS | Performed by: NEUROLOGICAL SURGERY

## 2024-02-22 PROCEDURE — 61886 IMPLANT NEUROSTIM ARRAYS: CPT | Performed by: NEUROLOGICAL SURGERY

## 2024-02-22 PROCEDURE — 2020000001 HC ICU ROOM DAILY

## 2024-02-22 PROCEDURE — 2500000005 HC RX 250 GENERAL PHARMACY W/O HCPCS: Performed by: NEUROLOGICAL SURGERY

## 2024-02-22 PROCEDURE — 00H03MZ INSERTION OF NEUROSTIMULATOR LEAD INTO BRAIN, PERCUTANEOUS APPROACH: ICD-10-PCS | Performed by: NEUROLOGICAL SURGERY

## 2024-02-22 PROCEDURE — 2500000004 HC RX 250 GENERAL PHARMACY W/ HCPCS (ALT 636 FOR OP/ED): Performed by: STUDENT IN AN ORGANIZED HEALTH CARE EDUCATION/TRAINING PROGRAM

## 2024-02-22 PROCEDURE — 2500000001 HC RX 250 WO HCPCS SELF ADMINISTERED DRUGS (ALT 637 FOR MEDICARE OP): Performed by: NEUROLOGICAL SURGERY

## 2024-02-22 PROCEDURE — 76000 FLUOROSCOPY <1 HR PHYS/QHP: CPT

## 2024-02-22 PROCEDURE — 84146 ASSAY OF PROLACTIN: CPT | Mod: AHULAB | Performed by: NURSE PRACTITIONER

## 2024-02-22 PROCEDURE — 83605 ASSAY OF LACTIC ACID: CPT | Performed by: NURSE PRACTITIONER

## 2024-02-22 PROCEDURE — 61864 IMPLANT NEUROELECTRDE ADDL: CPT | Performed by: NEUROLOGICAL SURGERY

## 2024-02-22 PROCEDURE — 70450 CT HEAD/BRAIN W/O DYE: CPT | Performed by: RADIOLOGY

## 2024-02-22 DEVICE — IMPLANTABLE DEVICE: Type: IMPLANTABLE DEVICE | Site: BRAIN | Status: FUNCTIONAL

## 2024-02-22 DEVICE — CROSS PIN, AXS SELF-TAP, 1.5 X 4MM: Type: IMPLANTABLE DEVICE | Site: CRANIAL | Status: FUNCTIONAL

## 2024-02-22 DEVICE — PLATE, 2H 10MM BAR ULTRA LOW PROFILE: Type: IMPLANTABLE DEVICE | Site: CRANIAL | Status: FUNCTIONAL

## 2024-02-22 RX ORDER — ROCURONIUM BROMIDE 10 MG/ML
INJECTION, SOLUTION INTRAVENOUS AS NEEDED
Status: DISCONTINUED | OUTPATIENT
Start: 2024-02-22 | End: 2024-02-22

## 2024-02-22 RX ORDER — BACLOFEN 5 MG/1
15 TABLET ORAL 3 TIMES DAILY
Status: DISCONTINUED | OUTPATIENT
Start: 2024-02-22 | End: 2024-02-23 | Stop reason: HOSPADM

## 2024-02-22 RX ORDER — PROMETHAZINE HYDROCHLORIDE 25 MG/1
25 SUPPOSITORY RECTAL EVERY 12 HOURS PRN
Status: DISCONTINUED | OUTPATIENT
Start: 2024-02-22 | End: 2024-02-22

## 2024-02-22 RX ORDER — LANOLIN ALCOHOL/MO/W.PET/CERES
400 CREAM (GRAM) TOPICAL DAILY
Status: DISCONTINUED | OUTPATIENT
Start: 2024-02-23 | End: 2024-02-23 | Stop reason: HOSPADM

## 2024-02-22 RX ORDER — ONDANSETRON HYDROCHLORIDE 2 MG/ML
4 INJECTION, SOLUTION INTRAVENOUS ONCE AS NEEDED
Status: DISCONTINUED | OUTPATIENT
Start: 2024-02-22 | End: 2024-02-22

## 2024-02-22 RX ORDER — CARBIDOPA AND LEVODOPA 25; 100 MG/1; MG/1
0.5 TABLET, EXTENDED RELEASE ORAL 4 TIMES DAILY
Status: DISCONTINUED | OUTPATIENT
Start: 2024-02-22 | End: 2024-02-22

## 2024-02-22 RX ORDER — ONDANSETRON HYDROCHLORIDE 2 MG/ML
INJECTION, SOLUTION INTRAVENOUS AS NEEDED
Status: DISCONTINUED | OUTPATIENT
Start: 2024-02-22 | End: 2024-02-22

## 2024-02-22 RX ORDER — CEFAZOLIN 1 G/1
INJECTION, POWDER, FOR SOLUTION INTRAVENOUS AS NEEDED
Status: DISCONTINUED | OUTPATIENT
Start: 2024-02-22 | End: 2024-02-22

## 2024-02-22 RX ORDER — ONDANSETRON 4 MG/1
4 TABLET, FILM COATED ORAL EVERY 8 HOURS PRN
Status: DISCONTINUED | OUTPATIENT
Start: 2024-02-22 | End: 2024-02-23 | Stop reason: HOSPADM

## 2024-02-22 RX ORDER — ACETAMINOPHEN 325 MG/1
650 TABLET ORAL EVERY 6 HOURS SCHEDULED
Status: DISCONTINUED | OUTPATIENT
Start: 2024-02-22 | End: 2024-02-23 | Stop reason: HOSPADM

## 2024-02-22 RX ORDER — LORAZEPAM 2 MG/ML
1 INJECTION INTRAMUSCULAR ONCE
Status: COMPLETED | OUTPATIENT
Start: 2024-02-22 | End: 2024-02-22

## 2024-02-22 RX ORDER — LIDOCAINE HYDROCHLORIDE 10 MG/ML
0.1 INJECTION, SOLUTION EPIDURAL; INFILTRATION; INTRACAUDAL; PERINEURAL ONCE
Status: DISCONTINUED | OUTPATIENT
Start: 2024-02-22 | End: 2024-02-22 | Stop reason: HOSPADM

## 2024-02-22 RX ORDER — NALOXONE HYDROCHLORIDE 0.4 MG/ML
0.2 INJECTION, SOLUTION INTRAMUSCULAR; INTRAVENOUS; SUBCUTANEOUS EVERY 5 MIN PRN
Status: DISCONTINUED | OUTPATIENT
Start: 2024-02-22 | End: 2024-02-23 | Stop reason: HOSPADM

## 2024-02-22 RX ORDER — LIDOCAINE HYDROCHLORIDE 20 MG/ML
INJECTION, SOLUTION EPIDURAL; INFILTRATION; INTRACAUDAL; PERINEURAL AS NEEDED
Status: DISCONTINUED | OUTPATIENT
Start: 2024-02-22 | End: 2024-02-22

## 2024-02-22 RX ORDER — CEFAZOLIN SODIUM 2 G/100ML
2 INJECTION, SOLUTION INTRAVENOUS ONCE
Status: DISCONTINUED | OUTPATIENT
Start: 2024-02-22 | End: 2024-02-22

## 2024-02-22 RX ORDER — SODIUM CHLORIDE, SODIUM LACTATE, POTASSIUM CHLORIDE, CALCIUM CHLORIDE 600; 310; 30; 20 MG/100ML; MG/100ML; MG/100ML; MG/100ML
100 INJECTION, SOLUTION INTRAVENOUS CONTINUOUS
Status: DISCONTINUED | OUTPATIENT
Start: 2024-02-22 | End: 2024-02-22 | Stop reason: HOSPADM

## 2024-02-22 RX ORDER — PHENYLEPHRINE HCL IN 0.9% NACL 1 MG/10 ML
SYRINGE (ML) INTRAVENOUS AS NEEDED
Status: DISCONTINUED | OUTPATIENT
Start: 2024-02-22 | End: 2024-02-22

## 2024-02-22 RX ORDER — PROMETHAZINE HYDROCHLORIDE 25 MG/1
25 TABLET ORAL EVERY 6 HOURS PRN
Status: DISCONTINUED | OUTPATIENT
Start: 2024-02-22 | End: 2024-02-23 | Stop reason: HOSPADM

## 2024-02-22 RX ORDER — POLYETHYLENE GLYCOL 3350 17 G/17G
17 POWDER, FOR SOLUTION ORAL DAILY
Status: DISCONTINUED | OUTPATIENT
Start: 2024-02-22 | End: 2024-02-23 | Stop reason: HOSPADM

## 2024-02-22 RX ORDER — HYDRALAZINE HYDROCHLORIDE 20 MG/ML
10 INJECTION INTRAMUSCULAR; INTRAVENOUS
Status: DISCONTINUED | OUTPATIENT
Start: 2024-02-22 | End: 2024-02-22

## 2024-02-22 RX ORDER — AMOXICILLIN 250 MG
2 CAPSULE ORAL 2 TIMES DAILY
Status: DISCONTINUED | OUTPATIENT
Start: 2024-02-22 | End: 2024-02-23 | Stop reason: HOSPADM

## 2024-02-22 RX ORDER — CEFAZOLIN SODIUM 1 G/50ML
1 SOLUTION INTRAVENOUS EVERY 8 HOURS
Status: DISCONTINUED | OUTPATIENT
Start: 2024-02-22 | End: 2024-02-23 | Stop reason: HOSPADM

## 2024-02-22 RX ORDER — HYDROMORPHONE HYDROCHLORIDE 1 MG/ML
0.2 INJECTION, SOLUTION INTRAMUSCULAR; INTRAVENOUS; SUBCUTANEOUS
Status: DISCONTINUED | OUTPATIENT
Start: 2024-02-22 | End: 2024-02-23 | Stop reason: HOSPADM

## 2024-02-22 RX ORDER — OXYCODONE HYDROCHLORIDE 5 MG/1
10 TABLET ORAL EVERY 4 HOURS PRN
Status: DISCONTINUED | OUTPATIENT
Start: 2024-02-22 | End: 2024-02-23 | Stop reason: HOSPADM

## 2024-02-22 RX ORDER — CARBIDOPA AND LEVODOPA 25; 100 MG/1; MG/1
0.5 TABLET ORAL 4 TIMES DAILY
Status: DISCONTINUED | OUTPATIENT
Start: 2024-02-22 | End: 2024-02-23 | Stop reason: HOSPADM

## 2024-02-22 RX ORDER — MEPERIDINE HYDROCHLORIDE 25 MG/ML
12.5 INJECTION INTRAMUSCULAR; INTRAVENOUS; SUBCUTANEOUS EVERY 10 MIN PRN
Status: DISCONTINUED | OUTPATIENT
Start: 2024-02-22 | End: 2024-02-22 | Stop reason: HOSPADM

## 2024-02-22 RX ORDER — HYDRALAZINE HYDROCHLORIDE 20 MG/ML
10 INJECTION INTRAMUSCULAR; INTRAVENOUS EVERY 30 MIN PRN
Status: DISCONTINUED | OUTPATIENT
Start: 2024-02-22 | End: 2024-02-23 | Stop reason: HOSPADM

## 2024-02-22 RX ORDER — DOCUSATE SODIUM 100 MG/1
100 CAPSULE, LIQUID FILLED ORAL 2 TIMES DAILY
Status: DISCONTINUED | OUTPATIENT
Start: 2024-02-22 | End: 2024-02-22

## 2024-02-22 RX ORDER — OXYCODONE HYDROCHLORIDE 5 MG/1
5 TABLET ORAL EVERY 4 HOURS PRN
Status: DISCONTINUED | OUTPATIENT
Start: 2024-02-22 | End: 2024-02-23 | Stop reason: HOSPADM

## 2024-02-22 RX ORDER — PROPOFOL 10 MG/ML
INJECTION, EMULSION INTRAVENOUS AS NEEDED
Status: DISCONTINUED | OUTPATIENT
Start: 2024-02-22 | End: 2024-02-22

## 2024-02-22 RX ORDER — DEXAMETHASONE SODIUM PHOSPHATE 4 MG/ML
INJECTION, SOLUTION INTRA-ARTICULAR; INTRALESIONAL; INTRAMUSCULAR; INTRAVENOUS; SOFT TISSUE AS NEEDED
Status: DISCONTINUED | OUTPATIENT
Start: 2024-02-22 | End: 2024-02-22

## 2024-02-22 RX ORDER — LORAZEPAM 0.5 MG/1
0.5 TABLET ORAL 2 TIMES DAILY
Status: DISCONTINUED | OUTPATIENT
Start: 2024-02-22 | End: 2024-02-23 | Stop reason: HOSPADM

## 2024-02-22 RX ORDER — BACITRACIN 500 [USP'U]/G
OINTMENT TOPICAL AS NEEDED
Status: DISCONTINUED | OUTPATIENT
Start: 2024-02-22 | End: 2024-02-22 | Stop reason: HOSPADM

## 2024-02-22 RX ORDER — OXYCODONE HYDROCHLORIDE 5 MG/1
5 TABLET ORAL EVERY 4 HOURS PRN
Status: DISCONTINUED | OUTPATIENT
Start: 2024-02-22 | End: 2024-02-22

## 2024-02-22 RX ORDER — ONDANSETRON HYDROCHLORIDE 2 MG/ML
4 INJECTION, SOLUTION INTRAVENOUS EVERY 8 HOURS PRN
Status: DISCONTINUED | OUTPATIENT
Start: 2024-02-22 | End: 2024-02-23 | Stop reason: HOSPADM

## 2024-02-22 RX ORDER — FENTANYL CITRATE 50 UG/ML
INJECTION, SOLUTION INTRAMUSCULAR; INTRAVENOUS AS NEEDED
Status: DISCONTINUED | OUTPATIENT
Start: 2024-02-22 | End: 2024-02-22

## 2024-02-22 RX ORDER — SODIUM CHLORIDE, SODIUM LACTATE, POTASSIUM CHLORIDE, CALCIUM CHLORIDE 600; 310; 30; 20 MG/100ML; MG/100ML; MG/100ML; MG/100ML
100 INJECTION, SOLUTION INTRAVENOUS CONTINUOUS
Status: DISCONTINUED | OUTPATIENT
Start: 2024-02-22 | End: 2024-02-22

## 2024-02-22 RX ORDER — BISACODYL 5 MG
10 TABLET, DELAYED RELEASE (ENTERIC COATED) ORAL DAILY PRN
Status: DISCONTINUED | OUTPATIENT
Start: 2024-02-22 | End: 2024-02-23 | Stop reason: HOSPADM

## 2024-02-22 RX ADMIN — FENTANYL CITRATE 100 MCG: 50 INJECTION, SOLUTION INTRAMUSCULAR; INTRAVENOUS at 08:17

## 2024-02-22 RX ADMIN — CEFAZOLIN 2 G: 1 INJECTION, POWDER, FOR SOLUTION INTRAMUSCULAR; INTRAVENOUS at 13:12

## 2024-02-22 RX ADMIN — PROPOFOL 110 MG: 10 INJECTION, EMULSION INTRAVENOUS at 08:17

## 2024-02-22 RX ADMIN — SODIUM CHLORIDE, POTASSIUM CHLORIDE, SODIUM LACTATE AND CALCIUM CHLORIDE: 600; 310; 30; 20 INJECTION, SOLUTION INTRAVENOUS at 12:46

## 2024-02-22 RX ADMIN — MEPERIDINE HYDROCHLORIDE 12.5 MG: 25 INJECTION INTRAMUSCULAR; INTRAVENOUS; SUBCUTANEOUS at 15:57

## 2024-02-22 RX ADMIN — HYDRALAZINE HYDROCHLORIDE 10 MG: 20 INJECTION INTRAMUSCULAR; INTRAVENOUS at 14:56

## 2024-02-22 RX ADMIN — PROPOFOL 20 MG: 10 INJECTION, EMULSION INTRAVENOUS at 12:38

## 2024-02-22 RX ADMIN — ROCURONIUM BROMIDE 10 MG: 10 INJECTION, SOLUTION INTRAVENOUS at 08:50

## 2024-02-22 RX ADMIN — FENTANYL CITRATE 50 MCG: 50 INJECTION, SOLUTION INTRAMUSCULAR; INTRAVENOUS at 12:38

## 2024-02-22 RX ADMIN — BACLOFEN 15 MG: 5 TABLET ORAL at 20:38

## 2024-02-22 RX ADMIN — CARBIDOPA AND LEVODOPA 0.5 TABLET: 25; 100 TABLET ORAL at 20:37

## 2024-02-22 RX ADMIN — ROCURONIUM BROMIDE 10 MG: 10 INJECTION, SOLUTION INTRAVENOUS at 11:48

## 2024-02-22 RX ADMIN — LORAZEPAM 0.5 MG: 0.5 TABLET ORAL at 20:37

## 2024-02-22 RX ADMIN — ROCURONIUM BROMIDE 10 MG: 10 INJECTION, SOLUTION INTRAVENOUS at 09:35

## 2024-02-22 RX ADMIN — MEPERIDINE HYDROCHLORIDE 12.5 MG: 25 INJECTION INTRAMUSCULAR; INTRAVENOUS; SUBCUTANEOUS at 17:04

## 2024-02-22 RX ADMIN — ROCURONIUM BROMIDE 10 MG: 10 INJECTION, SOLUTION INTRAVENOUS at 10:35

## 2024-02-22 RX ADMIN — ROCURONIUM BROMIDE 10 MG: 10 INJECTION, SOLUTION INTRAVENOUS at 10:05

## 2024-02-22 RX ADMIN — LORAZEPAM 1 MG: 2 INJECTION INTRAMUSCULAR; INTRAVENOUS at 17:20

## 2024-02-22 RX ADMIN — CEFAZOLIN SODIUM 1 G: 1 INJECTION, SOLUTION INTRAVENOUS at 20:37

## 2024-02-22 RX ADMIN — SODIUM CHLORIDE, POTASSIUM CHLORIDE, SODIUM LACTATE AND CALCIUM CHLORIDE 100 ML/HR: 600; 310; 30; 20 INJECTION, SOLUTION INTRAVENOUS at 06:51

## 2024-02-22 RX ADMIN — SUGAMMADEX 100 MG: 100 INJECTION, SOLUTION INTRAVENOUS at 13:34

## 2024-02-22 RX ADMIN — ONDANSETRON 4 MG: 2 INJECTION INTRAMUSCULAR; INTRAVENOUS at 13:11

## 2024-02-22 RX ADMIN — PROPOFOL 70 MG: 10 INJECTION, EMULSION INTRAVENOUS at 12:46

## 2024-02-22 RX ADMIN — FENTANYL CITRATE 25 MCG: 50 INJECTION, SOLUTION INTRAMUSCULAR; INTRAVENOUS at 10:12

## 2024-02-22 RX ADMIN — ROCURONIUM BROMIDE 40 MG: 10 INJECTION, SOLUTION INTRAVENOUS at 08:18

## 2024-02-22 RX ADMIN — CEFAZOLIN 1 G: 1 INJECTION, POWDER, FOR SOLUTION INTRAMUSCULAR; INTRAVENOUS at 09:31

## 2024-02-22 RX ADMIN — DOCUSATE SODIUM AND SENNOSIDES 2 TABLET: 8.6; 5 TABLET, FILM COATED ORAL at 20:37

## 2024-02-22 RX ADMIN — LIDOCAINE HYDROCHLORIDE 60 MG: 20 INJECTION, SOLUTION EPIDURAL; INFILTRATION; INTRACAUDAL; PERINEURAL at 08:17

## 2024-02-22 RX ADMIN — DEXAMETHASONE SODIUM PHOSPHATE 8 MG: 4 INJECTION, SOLUTION INTRAMUSCULAR; INTRAVENOUS at 08:28

## 2024-02-22 RX ADMIN — HYDROMORPHONE HYDROCHLORIDE 0.5 MG: 1 INJECTION, SOLUTION INTRAMUSCULAR; INTRAVENOUS; SUBCUTANEOUS at 15:35

## 2024-02-22 RX ADMIN — FENTANYL CITRATE 25 MCG: 50 INJECTION, SOLUTION INTRAMUSCULAR; INTRAVENOUS at 13:05

## 2024-02-22 RX ADMIN — Medication 100 MCG: at 08:26

## 2024-02-22 RX ADMIN — ROCURONIUM BROMIDE 10 MG: 10 INJECTION, SOLUTION INTRAVENOUS at 10:56

## 2024-02-22 RX ADMIN — CEFAZOLIN 1.5 G: 1 INJECTION, POWDER, FOR SOLUTION INTRAMUSCULAR; INTRAVENOUS at 08:21

## 2024-02-22 SDOH — SOCIAL STABILITY: SOCIAL INSECURITY: DO YOU FEEL ANYONE HAS EXPLOITED OR TAKEN ADVANTAGE OF YOU FINANCIALLY OR OF YOUR PERSONAL PROPERTY?: UNABLE TO ASSESS

## 2024-02-22 SDOH — SOCIAL STABILITY: SOCIAL INSECURITY: WERE YOU ABLE TO COMPLETE ALL THE BEHAVIORAL HEALTH SCREENINGS?: NO

## 2024-02-22 SDOH — SOCIAL STABILITY: SOCIAL INSECURITY: HAS ANYONE EVER THREATENED TO HURT YOUR FAMILY OR YOUR PETS?: UNABLE TO ASSESS

## 2024-02-22 SDOH — SOCIAL STABILITY: SOCIAL INSECURITY: DO YOU FEEL UNSAFE GOING BACK TO THE PLACE WHERE YOU ARE LIVING?: UNABLE TO ASSESS

## 2024-02-22 SDOH — SOCIAL STABILITY: SOCIAL INSECURITY: HAVE YOU HAD THOUGHTS OF HARMING ANYONE ELSE?: UNABLE TO ASSESS

## 2024-02-22 SDOH — SOCIAL STABILITY: SOCIAL INSECURITY: ARE YOU OR HAVE YOU BEEN THREATENED OR ABUSED PHYSICALLY, EMOTIONALLY, OR SEXUALLY BY ANYONE?: UNABLE TO ASSESS

## 2024-02-22 SDOH — SOCIAL STABILITY: SOCIAL INSECURITY: DOES ANYONE TRY TO KEEP YOU FROM HAVING/CONTACTING OTHER FRIENDS OR DOING THINGS OUTSIDE YOUR HOME?: UNABLE TO ASSESS

## 2024-02-22 SDOH — SOCIAL STABILITY: SOCIAL INSECURITY: ARE THERE ANY APPARENT SIGNS OF INJURIES/BEHAVIORS THAT COULD BE RELATED TO ABUSE/NEGLECT?: UNABLE TO ASSESS

## 2024-02-22 ASSESSMENT — PAIN - FUNCTIONAL ASSESSMENT
PAIN_FUNCTIONAL_ASSESSMENT: WONG-BAKER FACES
PAIN_FUNCTIONAL_ASSESSMENT: CPOT (CRITICAL CARE PAIN OBSERVATION TOOL)
PAIN_FUNCTIONAL_ASSESSMENT: WONG-BAKER FACES
PAIN_FUNCTIONAL_ASSESSMENT: CPOT (CRITICAL CARE PAIN OBSERVATION TOOL)
PAIN_FUNCTIONAL_ASSESSMENT: WONG-BAKER FACES

## 2024-02-22 ASSESSMENT — PAIN SCALES - WONG BAKER

## 2024-02-22 ASSESSMENT — ACTIVITIES OF DAILY LIVING (ADL)
FEEDING YOURSELF: UNABLE TO ASSESS
PATIENT'S MEMORY ADEQUATE TO SAFELY COMPLETE DAILY ACTIVITIES?: UNABLE TO ASSESS
HEARING - LEFT EAR: UNABLE TO ASSESS
TOILETING: UNABLE TO ASSESS
GROOMING: UNABLE TO ASSESS
ADEQUATE_TO_COMPLETE_ADL: UNABLE TO ASSESS
JUDGMENT_ADEQUATE_SAFELY_COMPLETE_DAILY_ACTIVITIES: UNABLE TO ASSESS
BATHING: UNABLE TO ASSESS
LACK_OF_TRANSPORTATION: PATIENT DECLINED
ASSISTIVE_DEVICE: OTHER (COMMENT)
HEARING - RIGHT EAR: UNABLE TO ASSESS
DRESSING YOURSELF: UNABLE TO ASSESS
WALKS IN HOME: UNABLE TO ASSESS

## 2024-02-22 ASSESSMENT — COLUMBIA-SUICIDE SEVERITY RATING SCALE - C-SSRS
6. HAVE YOU EVER DONE ANYTHING, STARTED TO DO ANYTHING, OR PREPARED TO DO ANYTHING TO END YOUR LIFE?: NO
1. IN THE PAST MONTH, HAVE YOU WISHED YOU WERE DEAD OR WISHED YOU COULD GO TO SLEEP AND NOT WAKE UP?: NO
2. HAVE YOU ACTUALLY HAD ANY THOUGHTS OF KILLING YOURSELF?: NO

## 2024-02-22 ASSESSMENT — COGNITIVE AND FUNCTIONAL STATUS - GENERAL
DRESSING REGULAR LOWER BODY CLOTHING: TOTAL
PATIENT BASELINE BEDBOUND: YES
TURNING FROM BACK TO SIDE WHILE IN FLAT BAD: TOTAL
DAILY ACTIVITIY SCORE: 6
DRESSING REGULAR UPPER BODY CLOTHING: TOTAL
WALKING IN HOSPITAL ROOM: TOTAL
HELP NEEDED FOR BATHING: TOTAL
TOILETING: TOTAL
EATING MEALS: TOTAL
PERSONAL GROOMING: TOTAL
STANDING UP FROM CHAIR USING ARMS: TOTAL
MOVING TO AND FROM BED TO CHAIR: TOTAL
MOBILITY SCORE: 6
MOVING FROM LYING ON BACK TO SITTING ON SIDE OF FLAT BED WITH BEDRAILS: TOTAL
CLIMB 3 TO 5 STEPS WITH RAILING: TOTAL

## 2024-02-22 ASSESSMENT — PAIN SCALES - GENERAL
PAINLEVEL_OUTOF10: 0 - NO PAIN
PAINLEVEL_OUTOF10: 0 - NO PAIN

## 2024-02-22 ASSESSMENT — LIFESTYLE VARIABLES
HOW OFTEN DO YOU HAVE 6 OR MORE DRINKS ON ONE OCCASION: NEVER
SKIP TO QUESTIONS 9-10: 1
HOW OFTEN DO YOU HAVE A DRINK CONTAINING ALCOHOL: NEVER
AUDIT-C TOTAL SCORE: 0
AUDIT-C TOTAL SCORE: 0
HOW MANY STANDARD DRINKS CONTAINING ALCOHOL DO YOU HAVE ON A TYPICAL DAY: PATIENT DOES NOT DRINK

## 2024-02-22 ASSESSMENT — PATIENT HEALTH QUESTIONNAIRE - PHQ9
1. LITTLE INTEREST OR PLEASURE IN DOING THINGS: NOT AT ALL
2. FEELING DOWN, DEPRESSED OR HOPELESS: NOT AT ALL
SUM OF ALL RESPONSES TO PHQ9 QUESTIONS 1 & 2: 0

## 2024-02-22 NOTE — ANESTHESIA PROCEDURE NOTES
Airway  Date/Time: 2/22/2024 8:22 AM  Urgency: elective    Airway not difficult    Staffing  Performed: CRNA   Authorized by: Vitaliy Liang MD    Performed by: JULIETTE Justice-NADIA  Patient location during procedure: OR    Indications and Patient Condition  Indications for airway management: anesthesia  Spontaneous Ventilation: absent  Sedation level: deep  Preoxygenated: yes  Patient position: sniffing  Mask difficulty assessment: 1 - vent by mask    Final Airway Details  Final airway type: endotracheal airway      Successful airway: ETT  Cuffed: yes   Successful intubation technique: direct laryngoscopy  Facilitating devices/methods: intubating stylet  Endotracheal tube insertion site: oral  Blade: Michael  Blade size: #3  ETT size (mm): 7.0  Cormack-Lehane Classification: grade I - full view of glottis  Placement verified by: chest auscultation and capnometry   Measured from: teeth  ETT to teeth (cm): 21  Number of attempts at approach: 1    Additional Comments  White spots noted in posterior oropharynx

## 2024-02-22 NOTE — ANESTHESIA PREPROCEDURE EVALUATION
Patient: Eloise Costa    Procedure Information       Date/Time: 02/22/24 0730    Procedure: Bilateral Globus Pallidus Internus Deep Brain Stimulator Leads & Generator Placement (Bilateral: Head)    Location: [facility] A OR 06 / Virtual [facility] A OR    Surgeons: Marija Flynn MD            Relevant Problems   Cardiovascular   (+) HTN (hypertension)      Neuro/Psych  Neurodegenerative disorder  Dystonia  Cerebellar degeneration  Cognitive decline  Nonverbal  Panic attacks   (+) ADHD   (+) Anxiety   (+) Seizures (CMS/HCC)      Musculoskeletal  dystonia       Clinical information reviewed:    Allergies                NPO Detail:  NPO/Void Status  Date of Last Liquid: 02/22/24  Time of Last Liquid: 0430  Date of Last Solid: 02/21/24  Time of Last Solid: 1800  Last Intake Type: Clear fluids         Physical Exam    Airway  Mallampati: unable to assess     Cardiovascular    Dental    Pulmonary    Abdominal            Anesthesia Plan    History of general anesthesia?: yes  History of complications of general anesthesia?: no    ASA 3     general     intravenous induction   Anesthetic plan and risks discussed with patient and mother.

## 2024-02-22 NOTE — BRIEF OP NOTE
Date: 2024  OR Location: [facility] OR    Name: Eloise Costa, : 1995, Age: 28 y.o., MRN: [id number], Sex: female    Diagnosis  Pre-op Diagnosis     * Dystonia [G24.9] Post-op Diagnosis     * Dystonia [G24.9]     Procedures  Bilateral Globus Pallidus Internus Deep Brain Stimulator Leads & Generator Placement  34627 - VT STRTCTC IMPLTJ NSTIM ELTRD W/O RECORD 1ST ARRAY    VT STRTCTC IMPLTJ NSTIM ELTRD W/O RECORD EA ARRAY [[id number]]  VT INSJ/RPLCMT CRANIAL NEUROSTIM PULSE GENERATOR [[id number]]  Surgeons      * Marija Flynn - Primary    Resident/Fellow/Other Assistant:  Surgeon(s) and Role:    Procedure Summary  Anesthesia: General  ASA: III  Anesthesia Staff: Anesthesiologist: Charly Vences MD; Vitaliy Liang MD  CRNA: JULIETTE Justice-CRNA; JULIETTE Bates-CRNA  Estimated Blood Loss: 150mL  Intra-op Medications:   Administrations occurring from 0730 to 1400 on 24:   Medication Name Total Dose   thrombin (recombinant) (Recothrom) topical solution 10,000 Units   gelatin absorbable (Gelfoam) 100 sponge 1 each   bacitracin ointment 1 Application   bupivacaine PF (Marcaine) 0.25 % (2.5 mg/mL) 20 mL, lidocaine-epinephrine (Xylocaine W/EPI) 1 %-1:100,000 20 mL syringe 25 mL   ceFAZolin (Ancef) 4,000 mg, polymyxin B 200,000 Units in sodium chloride 0.9 % 4,000 mL irrigation 4,000 mL   lactated Ringer's infusion 85 mL              Anesthesia Record               Intraprocedure I/O Totals          Intake    lactated Ringer's infusion 1300.00 mL    Total Intake 1300 mL       Output    Urine 300 mL    Est. Blood Loss 150 mL    Total Output 450 mL       Net    Net Volume 850 mL          Specimen: No specimens collected     Staff:   Circulator: Kati Booker RN; Linda Angelo RN  Relief Circulator: Conrado Barry RN  Scrub Person: Turner Solorzano          Findings: Good lead placement confirmed on intra-op CT    Complications:  None; patient tolerated the procedure well.     Disposition: PACU -  hemodynamically stable.  Condition: stable  Specimens Collected: No specimens collected  Attending Attestation: I was present and scrubbed for the entire procedure.    Marija Flynn  Phone Number: [phone]

## 2024-02-22 NOTE — ANESTHESIA POSTPROCEDURE EVALUATION
Patient: Eloise Costa    Procedure Summary       Date: 02/22/24 Room / Location: [facility] A OR 06 / Virtual [facility] OR    Anesthesia Start: 0804 Anesthesia Stop: 1420    Procedure: Bilateral Globus Pallidus Internus Deep Brain Stimulator Leads & Generator Placement (Bilateral: Head) Diagnosis:       Dystonia      (Dystonia [G24.9])    Surgeons: Marija Flynn MD Responsible Provider: EDGARDO Justice    Anesthesia Type: general ASA Status: 3            Anesthesia Type: general    Vitals Value Taken Time   /67 02/22/24 1501   Temp 36.5 °C (97.7 °F) 02/22/24 1416   Pulse 88 02/22/24 1506   Resp 20 02/22/24 1500   SpO2 100 % 02/22/24 1506   Vitals shown include unvalidated device data.    Anesthesia Post Evaluation    Patient location during evaluation: bedside  Patient participation: complete - patient participated  Level of consciousness: awake  Pain management: adequate  Airway patency: patent  Cardiovascular status: acceptable  Respiratory status: acceptable  Hydration status: acceptable  Postoperative Nausea and Vomiting: none        No notable events documented.

## 2024-02-23 VITALS
RESPIRATION RATE: 16 BRPM | SYSTOLIC BLOOD PRESSURE: 107 MMHG | WEIGHT: 101.2 LBS | TEMPERATURE: 97.9 F | HEIGHT: 64 IN | BODY MASS INDEX: 17.28 KG/M2 | DIASTOLIC BLOOD PRESSURE: 73 MMHG | OXYGEN SATURATION: 99 % | HEART RATE: 99 BPM

## 2024-02-23 LAB — PROLACTIN SERPL-MCNC: 7.6 UG/L (ref 3–20)

## 2024-02-23 PROCEDURE — 2500000004 HC RX 250 GENERAL PHARMACY W/ HCPCS (ALT 636 FOR OP/ED): Performed by: NURSE PRACTITIONER

## 2024-02-23 PROCEDURE — 2500000001 HC RX 250 WO HCPCS SELF ADMINISTERED DRUGS (ALT 637 FOR MEDICARE OP): Performed by: NURSE PRACTITIONER

## 2024-02-23 PROCEDURE — 99231 SBSQ HOSP IP/OBS SF/LOW 25: CPT | Performed by: INTERNAL MEDICINE

## 2024-02-23 PROCEDURE — 2500000004 HC RX 250 GENERAL PHARMACY W/ HCPCS (ALT 636 FOR OP/ED): Performed by: STUDENT IN AN ORGANIZED HEALTH CARE EDUCATION/TRAINING PROGRAM

## 2024-02-23 RX ORDER — AMOXICILLIN 250 MG
2 CAPSULE ORAL 2 TIMES DAILY
Qty: 28 TABLET | Refills: 0 | Status: SHIPPED | OUTPATIENT
Start: 2024-02-23 | End: 2024-03-01

## 2024-02-23 RX ORDER — OXYCODONE HYDROCHLORIDE 5 MG/1
5 TABLET ORAL EVERY 4 HOURS PRN
Qty: 15 TABLET | Refills: 0 | Status: SHIPPED | OUTPATIENT
Start: 2024-02-23 | End: 2024-02-28

## 2024-02-23 RX ADMIN — ACETAMINOPHEN 650 MG: 325 TABLET ORAL at 08:16

## 2024-02-23 RX ADMIN — Medication 400 MG: at 08:16

## 2024-02-23 RX ADMIN — DOCUSATE SODIUM AND SENNOSIDES 2 TABLET: 8.6; 5 TABLET, FILM COATED ORAL at 08:16

## 2024-02-23 RX ADMIN — CEFAZOLIN SODIUM 1 G: 1 INJECTION, SOLUTION INTRAVENOUS at 04:27

## 2024-02-23 RX ADMIN — BACLOFEN 15 MG: 5 TABLET ORAL at 08:17

## 2024-02-23 RX ADMIN — ACETAMINOPHEN 650 MG: 325 TABLET ORAL at 00:00

## 2024-02-23 RX ADMIN — CARBIDOPA AND LEVODOPA 0.5 TABLET: 25; 100 TABLET ORAL at 08:16

## 2024-02-23 ASSESSMENT — COGNITIVE AND FUNCTIONAL STATUS - GENERAL
WALKING IN HOSPITAL ROOM: TOTAL
CLIMB 3 TO 5 STEPS WITH RAILING: TOTAL
STANDING UP FROM CHAIR USING ARMS: TOTAL
MOVING FROM LYING ON BACK TO SITTING ON SIDE OF FLAT BED WITH BEDRAILS: TOTAL
TURNING FROM BACK TO SIDE WHILE IN FLAT BAD: TOTAL
PERSONAL GROOMING: TOTAL
DRESSING REGULAR UPPER BODY CLOTHING: TOTAL
HELP NEEDED FOR BATHING: TOTAL
TOILETING: TOTAL
EATING MEALS: TOTAL
MOVING TO AND FROM BED TO CHAIR: TOTAL
MOBILITY SCORE: 6
DAILY ACTIVITIY SCORE: 6
DRESSING REGULAR LOWER BODY CLOTHING: TOTAL

## 2024-02-23 ASSESSMENT — PAIN - FUNCTIONAL ASSESSMENT
PAIN_FUNCTIONAL_ASSESSMENT: CPOT (CRITICAL CARE PAIN OBSERVATION TOOL)
PAIN_FUNCTIONAL_ASSESSMENT: CPOT (CRITICAL CARE PAIN OBSERVATION TOOL)

## 2024-02-23 ASSESSMENT — ACTIVITIES OF DAILY LIVING (ADL): LACK_OF_TRANSPORTATION: NO

## 2024-02-23 NOTE — CARE PLAN
Patient had schwab removed. She sat in chair and then voided 2 hours later and then we bladder scanned with only 31ml. Parents verbalized understanding discharge instructions and medications. Patient discharged to home left via wheelchair accompanied by mother and father in private vehicle.

## 2024-02-23 NOTE — DISCHARGE SUMMARY
Discharge Diagnosis  Dystonia    Issues Requiring Follow-Up  DBS programming    Test Results Pending At Discharge  Pending Labs       Order Current Status    Prolactin In process            Hospital Course   28yF h/o dystonia, ADHD, seizures, HTN, 2/23 s/p bilateral Gpi DBS placement.  Patient had uncomplicated recovery course and was stable for discharge home.     Pertinent Physical Exam At Time of Discharge  Physical Exam  Awake  Tracks  Extremities contracted, spontaneous movement x4    Home Medications     Medication List      START taking these medications     oxyCODONE 5 mg immediate release tablet; Commonly known as: Roxicodone;   Take 1 tablet (5 mg) by mouth every 4 hours if needed for moderate pain (4   - 6) for up to 5 days.   sennosides-docusate sodium 8.6-50 mg tablet; Commonly known as:   Lora-Colace; Take 2 tablets by mouth 2 times a day for 7 days.     CONTINUE taking these medications     baclofen 10 mg tablet; Commonly known as: Lioresal   carbidopa-levodopa  mg ER tablet; Commonly known as: Sinemet CR   docusate sodium 100 mg capsule; Commonly known as: Colace   LORazepam 1 mg tablet; Commonly known as: Ativan; Take 0.5 tablets (0.5   mg) by mouth 2 times a day.     STOP taking these medications     chlorhexidine 0.12 % solution; Commonly known as: Peridex       Outpatient Follow-Up  Future Appointments   Date Time Provider Department [place]   3/6/2024 10:00 AM NEUROSURGERY [facility] NURSE [id number] [facility]   3/22/2024  1:00 PM MD MANUEL [id number] [facility]       Bonita Vivar MD

## 2024-02-23 NOTE — NURSING NOTE
Pt fail swallow test, okay to give pt PO meds and fluid per NP KATHERINE Browne. This is pt baseline at home according to pt jaida Jarvis at bedside.

## 2024-02-23 NOTE — OP NOTE
Bilateral Globus Pallidus Internus Deep Brain Stimulator Leads & Generator Placement (B) Operative Note     Date: 2024  OR Location: [facility] OR    Name: Eloise Costa, : 1995, Age: 28 y.o., MRN: [id number], Sex: female    Diagnosis  Pre-op Diagnosis     * Dystonia [G24.9] Post-op Diagnosis     * Dystonia [G24.9]     Procedures  Bilateral Globus Pallidus Internus Deep Brain Stimulator Leads & Generator Placement  23175 - [place] STRTCTC IMPLTJ NSTIM ELTRD W/O RECORD 1ST ARRAY    [place] STRTCTC IMPLTJ NSTIM ELTRD W/O RECORD EA ARRAY [[id number]]  [place] INSJ/RPLCMT CRANIAL NEUROSTIM PULSE GENERATOR [[id number]]  Surgeons      * Marija Flynn - Primary    Resident/Fellow/Other Assistant:  Surgeon(s) and Role:    Procedure Summary  Anesthesia: General  ASA: III  Anesthesia Staff: Anesthesiologist: Charly Vences MD; Vitaliy Liang MD  CRNA: JULIETTE Justice-CRNA; HERACLIO BatesCRNA  Estimated Blood Loss: 100mL  Intra-op Medications:   Administrations occurring from 0730 to 1400 on 24:   Medication Name Total Dose   thrombin (recombinant) (Recothrom) topical solution 10,000 Units   gelatin absorbable (Gelfoam) 100 sponge 1 each   bacitracin ointment 1 Application   bupivacaine PF (Marcaine) 0.25 % (2.5 mg/mL) 20 mL, lidocaine-epinephrine (Xylocaine W/EPI) 1 %-1:100,000 20 mL syringe 25 mL   ceFAZolin (Ancef) 4,000 mg, polymyxin B 200,000 Units in sodium chloride 0.9 % 4,000 mL irrigation 4,000 mL   lactated Ringer's infusion 85 mL              Anesthesia Record               Intraprocedure I/O Totals          Intake    lactated Ringer's infusion 1300.00 mL    Total Intake 1300 mL       Output    Urine 300 mL    Est. Blood Loss 150 mL    Total Output 450 mL       Net    Net Volume 850 mL          Specimen: No specimens collected     Staff:   Circulator: Kati Booker RN; Linda Angelo RN  Relief Circulator: Conrado Barry RN  Scrub Person: Turner Solorzano         Drains and/or Catheters:   [REMOVED] Urethral  Catheter Non-latex 12 Fr. (Removed)   Site Assessment Clean;Skin intact 02/23/24 0035   Collection Container Standard drainage bag 02/23/24 0035   Securement Method Securing device (Describe) 02/23/24 0035   Reason for Continuing Urinary Catheterization accurate hourly measurement of urine volume in a critically ill patient that cannot be assessed by other volumes and urine collection strategies 02/22/24 1900   Output (mL) 50 mL 02/23/24 0800       Tourniquet Times:         Implants:  Implants       Type Name Action Serial No.      Neuro Interventional Implant LEAD KIT, 8-CH DIRECTIONAL, 40CM (1-3-3-1, SPACE 1.5) BLACK - [id number] - [id number] Implanted [id number]     Neuro Interventional Implant LEAD KIT, 8-CH DIRECTIONAL, 40CM (1-3-3-1, SPACE 1.5) BLACK - [id number] - [id number] Implanted [id number]     Screw PLATE, 2H 10MM BAR ULTRA LOW PROFILE - SN/A - [id number] Implanted N/A     Neuro Interventional Implant CROSS PIN, AXS SELF-TAP, 1.5 X 4MM - SN/ - [id number] Implanted N/     Neuro Interventional Implant EXTENSION, 50CM, EXTEND - [id number] - [id number] Implanted [id number]     Neuro Interventional Implant EXTENSION, 50CM, EXTEND - [id number] - [id number] Implanted [id number]     Neuro Interventional Implant GENERATOR, IPG, INFINITY 7 - [id number] - [id number] Implanted [id number]              Findings:     Indications: Eloise Costa is an 28 y.o. female who is having surgery for Dystonia [G24.9]. Her symptoms are refractory to medical management and she was found to be a good candidate for b/l Gpi DBS with Abbott system.    The patient was seen in the preoperative area. The risks, benefits, complications, treatment options, non-operative alternatives, expected recovery and outcomes were discussed with the patient. The possibilities of reaction to medication, pulmonary aspiration, injury to surrounding structures, bleeding, recurrent infection, the need for additional procedures, failure to diagnose a condition, and creating a  complication requiring transfusion or operation were discussed with the patient. The patient concurred with the proposed plan, giving informed consent.  The site of surgery was properly noted/marked if necessary per policy. The patient has been actively warmed in preoperative area. Preoperative antibiotics have been ordered and given within 1 hours of incision. Venous thrombosis prophylaxis have been ordered including bilateral sequential compression devices    Procedure Details:   The patient was brought to the operating room and underwent general endotracheal intubation per Anesthesia. She was positioned supine on the operating table with all pressure points padded appropriately.  A Leksell stereotactic head frame was applied with local anesthetic.  A 3D O-arm spin was performed with head frame in place, and the images were merged with the preoperative MRI and CT scan, and the final targeting plan was completed using the stealth navigation system.  At this point, the patient received preoperative antibiotics. She was prepped and draped in sterile fashion.  Local anesthetic was administered, and an incision was made in bilateral frontal regions.     Starting on the left side using the predetermined coordinates according to the [employer] frame Arc system, a small twist drill bony opening was made using a 3.2 mm drill bit.  A guide tube was passed down to target using the Leksell stereotactic frame targeting system.  Fluoroscopy was used to confirm that the guide tube was in appropriate position.  The Abbott tightly space lead was passed to the desired target using the directional lead, and again, fluoroscopy confirmed good position of the electrode.  The guide tube and the stylet were removed, and the lead position was confirmed to be stable.  The lead was then secured with a small titanium plate and screws, and the same steps were repeated on the contralateral side after adjusting the frame arc system to the  coordinates for the right side.  At this point, the 3D O-arm spin was performed and confirmed that both leads were in the desired location.The distal aspect of the electrodes were capped with the capping system and the torque wrench, and then, they were tunneled to the left posterior auricular region, and again, fluoroscopy confirmed the leads were in good position as were the distal ends. At this point, more local anesthetic was administered, and copious antibiotic irrigation was used. The incisions were closed in sequential layers with a sterile dressing.     The frame arc system was then disconnected and the patient was re-prepped and draped and re-dosed antibiotics for stage II of the procedure with the generator insertion.  Another incision was made in the left posterior auricular region overlying the previously tunneled electrodes, and the electrodes were accessed. Another incision was made in the left infraclavicular region, and a subcutaneous pocket was dissected.  The 2 incisions were tunneled together, and the extension cables were passed in between, and they were connected to the distal aspects of the previously tunneled DBS electrodes and secured with locking screws.     The distal end of the extension cables was then secured to the Abbott non-rechargeable generator and secured with locking screws inserted into the pocket and secured to the fascia with 2-0 silk sutures.  The impedances were all confirmed to be within normal limits. Both wounds were copiously irrigated with IrriSept irrigation, followed by antibiotic irrigation.  More local anesthetic was administered, and the incisions were closed in sequential layers. All counts were correct at the end of the case.    Complications:  None; patient tolerated the procedure well.    Disposition: PACU - hemodynamically stable.  Condition: stable         Additional Details:     Attending Attestation:     Marija Flynn  Phone Number: [phone]

## 2024-02-23 NOTE — H&P
History Of Present Illness  Eloise Costa is a 28 y.o. F with Pmhx 2015 (age 19) for anxiety and ADHD, 2016 developed balance issue  dyscoordinated YUSUF and dystonic posturing of the hands and feet especially when walking. 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 PARKER  today, Uneventful  OR course but reportedly while in the PACU had ? Seizure.     Patient arrived in the ICU, HDS stable, non verbal, BUE contracted, on 2-3L NC. Slightly tacycardic 109-110's B/P was 11/68. 1500 Labs were unremarkable.  I Spoke to the Matheus ( patient's Father) at bedside,  he stated that patient was uncomfortable in tHE ER, overly stimulating the patient, he noticed has he has seen the patient tensed up, and increase shaking, more prominent in her Lower extremities.    She is being admitted to the ICU for close monitoring      Past Medical History  Past Medical History:   Diagnosis Date    ADHD     Anxiety     Cerebellar degeneration (CMS/HCC)     Cognitive decline     Dystonia     Nonverbal     Panic attacks     Patient underweight     Seizure (CMS/HCC) 2021    grand mal    Systolic hypertension in child        Surgical History  Past Surgical History:   Procedure Laterality Date    CT HEAD WO IV CONTRAST  08/11/2023    Under anesthesia    MR BRAIN W AND WO CONTRAST  08/11/2023    Under anesthesia    NO PAST SURGERIES          Social History  She reports that she has never smoked. She has never used smokeless tobacco. She reports that she does not drink alcohol and does not use drugs.    Family History  Family History   Problem Relation Name Age of Onset    No Known Problems Mother      Hyperlipidemia Father      Hyperlipidemia Sister         "  Allergies  Patient has no known allergies.    Review of Systems   Reason unable to perform ROS: unable.        Physical Exam  Eyes:      Pupils: Pupils are equal, round, and reactive to light.   Cardiovascular:      Rate and Rhythm: Tachycardia present.      Pulses: Normal pulses.   Pulmonary:      Effort: Pulmonary effort is normal.      Breath sounds: Normal breath sounds.   Chest:      Comments: Left chest, Incision intact, red, no drainage   Abdominal:      General: Abdomen is flat.   Skin:     General: Skin is warm and dry.   Neurological:      Mental Status: She is alert.      Comments: Non verbal,   Bilateral UE: Contracted,  BLE:  non tense, episodic movement.           Last Recorded Vitals  Blood pressure 113/68, pulse 103, temperature 36.1 °C (97 °F), temperature source Skin, resp. rate 13, height 1.626 m (5' 4\"), weight (!) 44.6 kg (98 lb 5.2 oz), SpO2 99 %.    Relevant Results  Scheduled medications  [MAR Hold] acetaminophen, 650 mg, oral, q6h FAREED  [MAR Hold] baclofen, 15 mg, oral, TID  carbidopa-levodopa, 0.5 tablet, oral, 4x daily  ceFAZolin, 1 g, intravenous, q8h  [MAR Hold] LORazepam, 0.5 mg, oral, BID  [MAR Hold] polyethylene glycol, 17 g, oral, Daily  [MAR Hold] sennosides-docusate sodium, 2 tablet, oral, BID      Continuous medications     PRN medications  PRN medications: [MAR Hold] bisacodyl, [MAR Hold] hydrALAZINE, [MAR Hold] HYDROmorphone, [MAR Hold] naloxone, [MAR Hold] ondansetron **OR** [MAR Hold] ondansetron, [MAR Hold] oxyCODONE, [MAR Hold] oxyCODONE, oxygen, [MAR Hold] promethazine **OR** [DISCONTINUED] promethazine     Results for orders placed or performed during the hospital encounter of 02/22/24 (from the past 24 hour(s))   POCT GLUCOSE   Result Value Ref Range    POCT Glucose 160 (H) 74 - 99 mg/dL      FL less than 1 hour    Result Date: 2/22/2024  These images are not reportable by radiology and will not be interpreted by  Radiologists.      28 year old F with Pmhx 2015 (age " 19) for anxiety and ADHD, 2016 developed balance issue  dycoordinated YUSUF and dystonic posturing of the hands and feet especially when walking. 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 PARKER       Assessment/Plan   Principal Problem:    Dystonia  Active Problems:    ADHD    Seizures (CMS/HCC)    HTN (hypertension)    Seizure (CMS/HCC)    Neuro:   - check labs lactic and prolactin  -PRN benzodiazepine for Breakthrough seizure  -Seizure precaution  -maybe EEG  -Q1 neuro check per ICU protocol  -CAM ICU score q-shift  -Sleep/wake cycle hygiene  -Delirium precaution  - Resume all home medications :Baclofen, Sinement  -Pain Control- PRN pain medications      CV:    -Continuous cardiac monitoring and hourly vital signs    -Electrolytes daily monitoring     PULM:   -supplemental oxygen to keep spo2 > 92%     GI/FEN/Endo:     -NPO, bedside swallow before anything PO   RN did swallow eval, per RN- pt failed. However the father, at the bedside states this is the patient's baseline, does not use straw, or spoon, normal for her to drool on th side of her mouth.  -OK for PO meds for now, and ensure,   -Aspiration Precaution discussed   -Daily electrolyte as replete as needed       Renal/:   -Strict I&O  -Daily weights  -Renal Panel in am  -ICU electrolyte replacement protocol- keep potassium >4, magnesium >2. Trend daily BMP, mag, phos.  -Liza, for strict I/O      Heme/ID:     -SCDs  -pDVT- if OK with Neuro Sx  -ATB: jose-operiatuve ATB     MSK/Integumentary:  Reposition, Pressure relieving measures     Lines: IV, De Jesus                                                                                                                                                                Social/family/dispo: admit to ICU.  Code; Full.       I spent 60 minutes in the professional and overall care of this patient.      Reji Browne, APRN-CNP

## 2024-02-23 NOTE — PROGRESS NOTES
"Eloise Costa is a 28 y.o. female on day 1 of admission presenting with Dystonia.    Subjective   Did well overnight, back to baseline per dad. No more shaking episodes, patient smiling and interactive    Objective     Physical Exam  Awake, tracts  PERRL  Face symmetric  Contracted x4  Incisions c/d/i    Last Recorded Vitals  Blood pressure 107/62, pulse 98, temperature 36.4 °C (97.5 °F), temperature source Temporal, resp. rate 16, height 1.626 m (5' 4\"), weight 45.9 kg (101 lb 3.2 oz), SpO2 99 %.  Intake/Output last 3 Shifts:  I/O last 3 completed shifts:  In: 1550 (33.8 mL/kg) [I.V.:1550 (33.8 mL/kg)]  Out: 675 (14.7 mL/kg) [Urine:525 (0.3 mL/kg/hr); Blood:150]  Weight: 45.9 kg     Relevant Results      Assessment/Plan   Principal Problem:    Dystonia  Active Problems:    ADHD    Seizures (CMS/HCC)    HTN (hypertension)    Seizure (CMS/HCC)    28yF h/o dystonia, ADHD, seizures, HTN, 2/23 s/p bilateral Gpi DBS placement    Shaking episodes in PACU unlikely 2/2 seizures as not consistent with previous seminology, more likely related to stress/anxiety, coming out of anesthesia. Labs wnl and no new episodes overnight. Patient back to baseline and per dad feels ready to go home.    PLAN    ICU status, ok for floor today  Void trial  Out of bed  Medically ready for discharge      Kera Magana MD      "

## 2024-02-23 NOTE — PROGRESS NOTES
"Eloise Costa is a 28 y.o. female on day 1 of admission presenting with Dystonia.    Subjective  no seizures/shaking episodes overnight. No fever.        Objective     Physical Exam  Awake, alert, non-verbal but interactive, smiling   Flexion contractures noted  S1,s2  No accessory muscle recruitment   Last Recorded Vitals  Blood pressure 107/62, pulse 98, temperature 36.4 °C (97.5 °F), temperature source Temporal, resp. rate 16, height 1.626 m (5' 4\"), weight 45.9 kg (101 lb 3.2 oz), SpO2 99 %.  Intake/Output last 3 Shifts:  I/O last 3 completed shifts:  In: 1550 (33.8 mL/kg) [I.V.:1550 (33.8 mL/kg)]  Out: 675 (14.7 mL/kg) [Urine:525 (0.3 mL/kg/hr); Blood:150]  Weight: 45.9 kg     =             Assessment/Plan   Principal Problem:    Dystonia  Active Problems:    ADHD    Seizures (CMS/HCC)    HTN (hypertension)    Seizure (CMS/HCC)    POD #1 s/p B/L DBS for dystonia - no seizures   - stable, home per Neurosurgery        I spent 30 minutes in the professional and overall care of this patient.      Conrado CEE Pack, DO      "

## 2024-02-23 NOTE — PROGRESS NOTES
02/23/24 1016   Discharge Planning   Living Arrangements Parent   Support Systems Parent   Assistance Needed Assist   Type of Residence Private residence   Number of Stairs to Enter Residence 2   Number of Stairs Within Residence 0   Do you have animals or pets at home? Yes   Type of Animals or Pets one dog   Who is requesting discharge planning? Provider   Home or Post Acute Services None   Patient expects to be discharged to: [facility]- parents are caregivers   Does the patient need discharge transport arranged? Yes   RoundTrip coordination needed? Yes   Has discharge transport been arranged? No   Financial Resource Strain   How hard is it for you to pay for the very basics like food, housing, medical care, and heating? Not hard   Housing Stability   In the last 12 months, was there a time when you were not able to pay the mortgage or rent on time? N   In the last 12 months, how many places have you lived? 1   In the last 12 months, was there a time when you did not have a steady place to sleep or slept in a shelter (including now)? N   Transportation Needs   In the past 12 months, has lack of transportation kept you from medical appointments or from getting medications? no   In the past 12 months, has lack of transportation kept you from meetings, work, or from getting things needed for daily living? No   Patient Choice   Provider Choice list and CMS website (https://medicare.gov/care-compare#search) for post-acute Quality and Resource Measure Data were provided and reviewed with: Family   Patient / Family choosing to utilize agency / facility established prior to hospitalization No             Met with parents at bedside and explained my role as care coordinator. Patient lives with her parents in the house. They are her caregivers. She is wheelchair bound. Father assists her to the bathroom. No oxygen in use at home, no HD. Her PCP is Dr. Irlanda Pitts, but she hasn't seen him for couple years. She sees regularly  neurosurgeon. Pharmacy they use is [facility] in [place]. Patient had Deep brain stimulator Leads and Generator placement yesterday. Patient has active discharge orders. She will go home with her parents and they deny any needs going home.

## 2024-03-06 ENCOUNTER — APPOINTMENT (OUTPATIENT)
Dept: NEUROSURGERY | Facility: CLINIC | Age: 29
End: 2024-03-06
Payer: MEDICAID

## 2024-03-06 DIAGNOSIS — G24.9 DYSTONIA: ICD-10-CM

## 2024-03-12 ENCOUNTER — APPOINTMENT (OUTPATIENT)
Dept: NEUROLOGY | Facility: CLINIC | Age: 29
End: 2024-03-12
Payer: MEDICAID

## 2024-03-19 ENCOUNTER — APPOINTMENT (OUTPATIENT)
Dept: NEUROLOGY | Facility: CLINIC | Age: 29
End: 2024-03-19
Payer: MEDICAID

## 2024-03-21 NOTE — PROGRESS NOTES
[facility]   Neurosurgery    Diagnosis  Eloise was seen today for pov.  Diagnoses and all orders for this visit:  Dystonia  Status post deep brain stimulator placement      Patient Discussion/Summary  1) Today we discussed wound care. Do not scrub incisions. Wash scalp incisions with gentle shampoo and chest incisions with mild soap and water, blot incisions dry, and leave open to air.     2) Avoid strenuous activity, such as bending, twisting, and overhead lifting, at this time.     3) Follow up with neurology for programming of your device.    4) I would like to see you back in 4 weeks to reassess your incisions and overall progress. In the interim, we discussed signs and symptoms to monitor and to contact our office sooner if issues arise.       Provider Impressions  28 y.o. female with generalized dystonia, with unclear etiology; also with notable cognitive and emotional decline over the years; now mostly non-verbal with limited comprehension; severe dystonia of UE b/l and LE b/l, as well as trunk. Prior to surgery it was discussed that patient may benefit from DBS, but with unclear outcomes due to the unknown etiology; also it would not improve cognition or emotional lability. Patient is now s/p b/l GPi DBS and L chest generator placement w/ [facility] on 2/22/24. Patient is doing well with regard to incisions healing and is scheduled for her first programming session on 4/3 with Mehdi.       History of Present Illness  Chief Complaint:   Chief Complaint   Patient presents with    POV         HPI: Eloise oCsta is a 28 y.o. female with generalized dystonia, with unclear etiology; also with notable cognitive and emotional decline over the years; now mostly non-verbal with limited comprehension; severe dystonia of UE b/l and LE b/l, as well as trunk. Prior to surgery it was discussed that patient may benefit from DBS, but with unclear outcomes due to the unknown etiology; also it would not improve  "cognition or emotional lability. Patient is now s/p b/l GPi DBS and L chest generator placement w/ [facility] on 2/22/24. Patient presents today for her postop visit.     Patient presents today with her parents, whom state patient has been doing well since surgery regarding incisions healing. Patient is scheduled for her first programming visit with Mehdi on 4/3.       ROS: As noted in HPI.      Previous History  Past Medical History:   Diagnosis Date    ADHD     Anxiety     Cerebellar degeneration (CMS/HCC)     Cognitive decline     Dystonia     Nonverbal     Panic attacks     Patient underweight     Seizure (CMS/HCC) 2021    grand mal    Systolic hypertension in child      Past Surgical History:   Procedure Laterality Date    CT HEAD WO IV CONTRAST  08/11/2023    Under anesthesia    MR BRAIN W AND WO CONTRAST  08/11/2023    Under anesthesia    NO PAST SURGERIES       Social History     Tobacco Use    Smoking status: Never    Smokeless tobacco: Never   Vaping Use    Vaping Use: Never used   Substance Use Topics    Alcohol use: Never    Drug use: Never     Family History   Problem Relation Name Age of Onset    No Known Problems Mother      Hyperlipidemia Father      Hyperlipidemia Sister       No Known Allergies  Current Outpatient Medications   Medication Instructions    baclofen (Lioresal) 10 mg tablet 1.5 tablets, oral, 3 times daily    carbidopa-levodopa (Sinemet CR)  mg ER tablet 1 tablet, oral, 4 times daily    docusate sodium (COLACE) 100 mg, oral, 2 times daily    LORazepam (ATIVAN) 0.5 mg, oral, 2 times daily         Vitals  /55   Pulse 73   Temp 36.6 °C (97.8 °F)   Resp 18   Ht 1.626 m (5' 4\")   Wt (!) 43.1 kg (95 lb)   BMI 16.31 kg/m²       Physical Exam  Well appearing, in no acute distress. Non-verbal. Severe dystonia and contractures of upper and lower extremities, leaning to right. Sitting in wheelchair. Incisions: L generator incision healing well, b/l scalp and L posterior auricular " incisions healing well, L scalp with large scab, all c/d/i

## 2024-03-22 ENCOUNTER — OFFICE VISIT (OUTPATIENT)
Dept: NEUROSURGERY | Facility: CLINIC | Age: 29
End: 2024-03-22
Payer: MEDICAID

## 2024-03-22 VITALS
RESPIRATION RATE: 18 BRPM | WEIGHT: 95 LBS | HEART RATE: 73 BPM | DIASTOLIC BLOOD PRESSURE: 55 MMHG | BODY MASS INDEX: 16.22 KG/M2 | HEIGHT: 64 IN | SYSTOLIC BLOOD PRESSURE: 128 MMHG | TEMPERATURE: 97.8 F

## 2024-03-22 DIAGNOSIS — Z96.89 STATUS POST DEEP BRAIN STIMULATOR PLACEMENT: ICD-10-CM

## 2024-03-22 DIAGNOSIS — G24.9 DYSTONIA: Primary | ICD-10-CM

## 2024-03-22 PROCEDURE — 3078F DIAST BP <80 MM HG: CPT | Performed by: NEUROLOGICAL SURGERY

## 2024-03-22 PROCEDURE — 3074F SYST BP LT 130 MM HG: CPT | Performed by: NEUROLOGICAL SURGERY

## 2024-03-22 PROCEDURE — 1036F TOBACCO NON-USER: CPT | Performed by: NEUROLOGICAL SURGERY

## 2024-03-22 PROCEDURE — 99024 POSTOP FOLLOW-UP VISIT: CPT | Performed by: NEUROLOGICAL SURGERY

## 2024-03-22 ASSESSMENT — PAIN SCALES - GENERAL: PAINLEVEL: 0-NO PAIN

## 2024-03-27 ENCOUNTER — APPOINTMENT (OUTPATIENT)
Dept: NEUROLOGY | Facility: CLINIC | Age: 29
End: 2024-03-27
Payer: MEDICAID

## 2024-03-27 ENCOUNTER — TELEPHONE (OUTPATIENT)
Dept: NEUROSURGERY | Facility: HOSPITAL | Age: 29
End: 2024-03-27

## 2024-03-27 NOTE — TELEPHONE ENCOUNTER
Pt had DBS surgery on 2/22.  Bouchra, pt's mom, says that she's more rigid and slides out of her chair.  They bought her a reclining chair to help with that and may be able to get some financial assistance thru a selene but needs a script for it.  Please discuss with mom.

## 2024-03-28 ENCOUNTER — TELEPHONE (OUTPATIENT)
Dept: NEUROLOGY | Facility: HOSPITAL | Age: 29
End: 2024-03-28
Payer: MEDICAID

## 2024-03-28 ENCOUNTER — TELEPHONE (OUTPATIENT)
Dept: NEUROSURGERY | Facility: HOSPITAL | Age: 29
End: 2024-03-28
Payer: MEDICAID

## 2024-03-28 ENCOUNTER — TELEPHONE (OUTPATIENT)
Dept: NEUROLOGY | Facility: CLINIC | Age: 29
End: 2024-03-28
Payer: MEDICAID

## 2024-03-28 NOTE — TELEPHONE ENCOUNTER
Eloise Costa 's mom Bouchra is returning your call. Please call at at your earliest convenience.    [phone]

## 2024-03-28 NOTE — TELEPHONE ENCOUNTER
Mom is calling again to see if she can get a script for recliner chair she bought for pt b/c she keeps sliding out of her regular chair.

## 2024-03-28 NOTE — TELEPHONE ENCOUNTER
----- Message from JULIETTE Martínez-CNP sent at 3/28/2024  9:13 AM EDT -----  Regarding: RX for reclincer chair  Good morning Mehdi,    Our office received a phone call from Ms. Costa's mom requesting a prescription for a recliner chair she bought for the patient. Is this something you are familiar with, or would be able to help with?    Thank you,    Tennille

## 2024-04-02 NOTE — PROGRESS NOTES
Subjective     Eloise Costa is a 28 y.o. year old female who presents with No chief complaint on file., here for follow up visit.    HPI  Patient is here for initial DBS programmings/p b/l GPi DBS and L chest generator placement w/ [facility] on 2/22/24.  Comes with parents today.    Difficulty getting under arms to shave and wash. Difficulty moving arms and legs to dress and transfer into car.    No issues after surgery, no changes to dystonia.    Difficult but her father walks her slightly from bathroom to bedroom    Off Sinemet 12.5hrs    Meds  Sinemet 25/100mg 0.5tabs 4 times a day (higher doses=anxious, crying and no benefit)--unsure if it helps  Baclofen 10mg 1.5tabs 3 times a day  Lorazepam 1mg 0.5 tab 2 times a day        Patient Health Questionnaire-2 Score: 0            Current Outpatient Medications:     baclofen (Lioresal) 10 mg tablet, Take 1.5 tablets (15 mg) by mouth 3 times a day., Disp: , Rfl:     carbidopa-levodopa (Sinemet CR)  mg ER tablet, Take 1 tablet by mouth 4 times a day., Disp: , Rfl:     docusate sodium (Colace) 100 mg capsule, Take 1 capsule (100 mg) by mouth 2 times a day., Disp: , Rfl:     LORazepam (Ativan) 1 mg tablet, Take 0.5 tablets (0.5 mg) by mouth 2 times a day., Disp: 90 tablet, Rfl: 0       Objective   Vitals:    04/03/24 0856   BP: 114/74   BP Location: Left arm   Patient Position: Sitting   BP Cuff Size: Adult   Pulse: 105                 Physical Exam  Dystonia in BUE and BLE--see picture in media, very difficult to move arms and legs  BUE adducted across chest, R knee inversion  Non verbal, sitting up in wheelchair      DBS Procedure Visit  [facility] 2/22/24  IPG >3.0  TI: L 1312 R-1275  SI: all OK    Initial monopolar review  Watching for SE/subtle changes, she has dystonic tremor in RLE and BUE when trying to move it, parents move extremities throughout review to see if easier since dressing/hygiene are issues for her    C+ /Freq 60 tried but charge density limit  reached at 2.5 (no SE) so did traditional MPR with PW 60 and freq at 130,      L GPi   60/130  1- 0.5--RUE slightly looser, at 2.0 she is moving RLE, 2.5 moving RUE some   2- arm goes back to midline until 2.0--looser, 3.5 her RLE straightens more  3-3.0 tighter compared to 1&2 but having more movement in RUE and RLE  4-same, benefit throughout w/ easier lifting but still difficult to move compared to 1-2    W/ DBS off RUE becomes adducted again against her chest again but had improvement throughout above    R GPi  60/130  9- 1.5 improved ROM LUE and continued to improve with higher setting 4.0 and her arm is no longer adducted to chest.   10- 2.0 she can lift her LUE much easier 3.5 SE neck dystonia/anterocollis and seemed different   11- 1.0 she has better ROM in LUE and 4.0 good ROM to LUE  12- 2.0 better ROM LUE but still difficult      Final settings:  Program 1: ACTIVE   L GPi  C+1- 2.0/60/130 ((0-2.0))  R GPi  C+9- 2.0/60/130 ((0-2.0))      Program 2:  L GPi  C+2- 2.5/60/130  R GPi  C+10- 2.5/60/130 ((0-2.5))        Assessment/Plan             Ms. Eloise Costa is a 28 y.o. female who presents for initial DBS visit for dystonia. She is s/p b/l GPi DBS and L chest generator placement w/ [facility] on 2/22/24. Genetic testing was done, and upon the last chart update by Dr. 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 She has generalized dystonia which has progressively worsened since she was diagnosed in 2016 (parents report she was normal prior to this), has not walked without assist since 2017. She is completely dependent for ADLs. Exam is very limited today due to being non-verbal, inability to follow commands, limited movement.  Though patient has dyskinesias with higher dose Sinemet, reported response to medication making caring for her easier but this response has waned over time. We will plan to wean Sinemet once she gets some benefit from DBS.      Future plans include PT/OT once able and wheelchair evaluation.     Discussed with Dr. Shaikh eldridge: lorazepam refill- for UA will defer d/t patient condition, for the benzo contract mother signed today.    Diagnoses and all orders for this visit:  Generalized dystonia      #DBS turned on today    You are going home on program 1.    If concerns or side effects you can always turn off and see if resolves. Then try program 2 but please let me know.     #Due for refill 4/12 of lorazepam--can send next week    #MRI brain canceled, we can consider obtaining only if needed/issues with programming.     #To get DBS piotr on your phone, please call [employer] rep [phone]    #DBS precautions    YOU CAN ONLY HAVE MRI DONE AT [facility] LIKE [facility]  Apt with movement disorders clinic is done immediately prior to MRI apt to check it and place in MRI mode, then you proceed to MRI with paperwork  YOU CAN NOT HAVE DIATHERMY  DBS needs turned off for EKG and then right back on when done  You could consider obtaining a medical alert bracelet that includes the phone number for DBS    At the airport, ask to get patted down instead of going through the detector  If you are to have surgery or any procedures:  Let our office know, you will need turned off and or placed in surgery mode for this  please have your surgeon contact our office to send instructions for DBS precautions  Discuss with your dentist if you are a candidate for antibiotics prior to cleaning your teeth      For assistance with DBS you can also contact     [facility] Technical Support at [phone]    #Follow up in 4 weeks with me        Total time of 85 minutes for today's visit, of which 72 mins were spent with DBS programming as noted above-checking settings, stimulator events, energy level, and impedances and updating settings in the chart.      Mehdi Busby, NP-C  Adult/Gerontological Nurse  Practitioner   [facility], Department of Neurology  [facility] [place]  [facility]  [address] [place] [address]  Phone: [phone]  Fax: [phone]

## 2024-04-03 ENCOUNTER — PROCEDURE VISIT (OUTPATIENT)
Dept: NEUROLOGY | Facility: CLINIC | Age: 29
End: 2024-04-03
Payer: MEDICAID

## 2024-04-03 VITALS — SYSTOLIC BLOOD PRESSURE: 114 MMHG | DIASTOLIC BLOOD PRESSURE: 74 MMHG | HEART RATE: 105 BPM

## 2024-04-03 DIAGNOSIS — F41.9 ANXIETY: ICD-10-CM

## 2024-04-03 DIAGNOSIS — G24.9 GENERALIZED DYSTONIA: Primary | ICD-10-CM

## 2024-04-03 PROCEDURE — 95983 ALYS BRN NPGT PRGRMG 15 MIN: CPT | Performed by: NURSE PRACTITIONER

## 2024-04-03 PROCEDURE — 95984 ALYS BRN NPGT PRGRMG ADDL 15: CPT | Performed by: NURSE PRACTITIONER

## 2024-04-03 ASSESSMENT — ENCOUNTER SYMPTOMS
OCCASIONAL FEELINGS OF UNSTEADINESS: 0
DEPRESSION: 0
LOSS OF SENSATION IN FEET: 0

## 2024-04-03 ASSESSMENT — PATIENT HEALTH QUESTIONNAIRE - PHQ9
1. LITTLE INTEREST OR PLEASURE IN DOING THINGS: NOT AT ALL
2. FEELING DOWN, DEPRESSED OR HOPELESS: NOT AT ALL
SUM OF ALL RESPONSES TO PHQ9 QUESTIONS 1 AND 2: 0

## 2024-04-03 NOTE — PATIENT INSTRUCTIONS
#DBS turned on today    You are going home on program 1.    If concerns or side effects you can always turn off and see if resolves. Then try program 2 but please let me know.     #Due for refill 4/12 of lorazepam--can send next week    #MRI brain canceled, we can consider obtaining only if needed/issues with programming.     #To get DBS piotr on your phone, please call [employer] rep [phone]    #DBS precautions    YOU CAN ONLY HAVE MRI DONE AT [facility] LIKE [facility]  Apt with movement disorders clinic is done immediately prior to MRI apt to check it and place in MRI mode, then you proceed to MRI with paperwork  YOU CAN NOT HAVE DIATHERMY  DBS needs turned off for EKG and then right back on when done  You could consider obtaining a medical alert bracelet that includes the phone number for DBS    At the airport, ask to get patted down instead of going through the detector  If you are to have surgery or any procedures:  Let our office know, you will need turned off and or placed in surgery mode for this  please have your surgeon contact our office to send instructions for DBS precautions  Discuss with your dentist if you are a candidate for antibiotics prior to cleaning your teeth      For assistance with DBS you can also contact     [facility] at [phone]    #Follow up in 4 weeks with me    Mehdi Busby NP-C  Adult/Gerontological Nurse Practitioner   [facility], Department of Neurology  [facility] [place]  [facility]  [address] [place] [address]  Phone: [phone]  Fax: [phone]

## 2024-04-09 RX ORDER — LORAZEPAM 1 MG/1
0.5 TABLET ORAL 2 TIMES DAILY
Qty: 90 TABLET | Refills: 0 | Status: SHIPPED | OUTPATIENT
Start: 2024-04-09

## 2024-05-01 ENCOUNTER — PROCEDURE VISIT (OUTPATIENT)
Dept: NEUROLOGY | Facility: CLINIC | Age: 29
End: 2024-05-01
Payer: MEDICAID

## 2024-05-01 VITALS — SYSTOLIC BLOOD PRESSURE: 110 MMHG | DIASTOLIC BLOOD PRESSURE: 79 MMHG | RESPIRATION RATE: 18 BRPM | HEART RATE: 88 BPM

## 2024-05-01 DIAGNOSIS — G24.9 GENERALIZED DYSTONIA: Primary | ICD-10-CM

## 2024-05-01 PROCEDURE — 95983 ALYS BRN NPGT PRGRMG 15 MIN: CPT | Performed by: NURSE PRACTITIONER

## 2024-05-01 PROCEDURE — 95984 ALYS BRN NPGT PRGRMG ADDL 15: CPT | Performed by: NURSE PRACTITIONER

## 2024-05-01 ASSESSMENT — ENCOUNTER SYMPTOMS
OCCASIONAL FEELINGS OF UNSTEADINESS: 0
LOSS OF SENSATION IN FEET: 0

## 2024-05-01 ASSESSMENT — PATIENT HEALTH QUESTIONNAIRE - PHQ9
1. LITTLE INTEREST OR PLEASURE IN DOING THINGS: NOT AT ALL
SUM OF ALL RESPONSES TO PHQ9 QUESTIONS 1 AND 2: 0
2. FEELING DOWN, DEPRESSED OR HOPELESS: NOT AT ALL

## 2024-05-01 NOTE — PATIENT INSTRUCTIONS
#DBS turned on today    You are going home on program 2 ((new settings)).     Program 1 is the program you came in on today and can always go back to if there are any concerns about side effects.    Send me a [employer] message in 3-4 weeks to let me know how she is doing. At that time I will see if she stays on current program 2 OR tries program 3 ((higher settings than 2))      #Follow up in 8 weeks with me    Mehdi Busby, NP-C  Adult/Gerontological Nurse Practitioner   [facility], Department of Neurology  [facility] [place]  [facility]  [address] [id number]  Phone: [phone]  Fax: [phone]

## 2024-05-01 NOTE — PROGRESS NOTES
"Subjective     Eloise Costa is a 28 y.o. year old female who presents with dystonia, here for follow up visit.    HPI    Here for 2nd DBS programming.     She comes with her mother, everything is \"a slight bit improved\", she could not really move her arms before and can now so definitely better.    No SE or any changes her mother is aware of.     Not a lot of difference when taking Sinemet or not. Movement is more uncoordinated (not helpful) when she takes it.     Meds  Sinemet 25/100mg 0.5tabs 4 times a day (higher doses=anxious, crying and no benefit)--unsure if it helps  Baclofen 10mg 1.5tabs 3 times a day  Lorazepam 1mg 0.5 tab 2 times a day        Patient Health Questionnaire-2 Score: 0            Current Outpatient Medications:     baclofen (Lioresal) 10 mg tablet, Take 1.5 tablets (15 mg) by mouth 3 times a day., Disp: , Rfl:     carbidopa-levodopa (Sinemet CR)  mg ER tablet, Take 1 tablet by mouth 4 times a day., Disp: , Rfl:     docusate sodium (Colace) 100 mg capsule, Take 1 capsule (100 mg) by mouth 2 times a day., Disp: , Rfl:     LORazepam (Ativan) 1 mg tablet, Take 0.5 tablets (0.5 mg) by mouth 2 times a day., Disp: 90 tablet, Rfl: 0       Objective   Vitals:    05/01/24 0852 05/01/24 0952   BP: 110/79    BP Location: Right arm    Patient Position: Sitting    BP Cuff Size: Adult    Pulse: (!) 111 88   Resp: 18    Weight: Comment: wheelchair                  Physical Exam  Dystonia in BUE and BLE- has some passive ROM  BUE adducted across chest, R knee inversion  Non verbal, sitting up in wheelchair      DBS Procedure Visit  [facility] 2/22/24  IPG >3.0  TI: L 1312 R-1275  SI: all OK      Initial Settings  Program 1: ACTIVE   L GPi  C+1- 2.0/60/130 ((0-2.0))  R GPi  C+9- 2.0/60/130 ((0-2.0))      Programming  Copied 1 to 3 and increased settings to amp 2.5 and PW 90-ROM in arms is better but severe neck pulling. Turned off and improved and back on and re-occurred.     Program 2 tried without " SE---copied this to program 3 and increased amp to 3.0 and PW to 80 on both sides---SE R facial twitching and neck pulling. Lowered amp in L GPi to 2.5 and PW 80 bilaterally and no SE---again, ROM is better per her mother. Then neck pulls again down to the R---lowered freq to 90 both sides and was able to increase R GPi amp back to 3.0    However, mother also notes that regardless of DBS mouth sometimes twitches AND her head pulls R but these seemed more severe.  Also, whenever she speaks her arms and legs cross and she straightens when she is more excited.       Program 1- most ventral contact--side effect with higher amp and PW so kept settings unchanged  Program 2- 2nd most ventral contact with some further improvement today  New program 3--higher settings than program 2 well tolerated       Final Settings  Program 2  C+2- 2.5/60/130  R GPi  C+10- 2.5/60/130 ((0-2.5))    Program 3:  C+2- 3.0/70/90 ((1.5-3.0))  R GPi  C+10- 3.0/70/90 ((1.5-3.0))      Program 1  L GPi  C+1- 2.0/60/130 ((0-2.0))  R GPi  C+9- 2.0/60/130 ((0-2.0))    --------------------Previous Programming------------------------  4/3/24  Initial monopolar review  Watching for SE/subtle changes, she has dystonic tremor in RLE and BUE when trying to move it, parents move extremities throughout review to see if easier since dressing/hygiene are issues for her    C+ /Freq 60 tried but charge density limit reached at 2.5 (no SE) so did traditional MPR with PW 60 and freq at 130,      L GPi   60/130  1- 0.5--RUE slightly looser, at 2.0 she is moving RLE, 2.5 moving RUE some   2- arm goes back to midline until 2.0--looser, 3.5 her RLE straightens more  3-3.0 tighter compared to 1&2 but having more movement in RUE and RLE  4-same, benefit throughout w/ easier lifting but still difficult to move compared to 1-2    W/ DBS off RUE becomes adducted again against her chest again but had improvement throughout above    R GPi  60/130  9- 1.5 improved ROM LUE  and continued to improve with higher setting 4.0 and her arm is no longer adducted to chest.   10- 2.0 she can lift her LUE much easier 3.5 SE neck dystonia/anterocollis and seemed different   11- 1.0 she has better ROM in LUE and 4.0 good ROM to LUE  12- 2.0 better ROM LUE but still difficult      Final settings:  Program 1: ACTIVE   L GPi  C+1- 2.0/60/130 ((0-2.0))  R GPi  C+9- 2.0/60/130 ((0-2.0))      Program 2:  L GPi  C+2- 2.5/60/130  R GPi  C+10- 2.5/60/130 ((0-2.5))        Assessment/Plan   Ms. Eloise Costa is a 28 y.o. female who presents for initial DBS visit for dystonia. She is s/p b/l GPi DBS and L chest generator placement w/ [facility] on 2/22/24. Genetic testing was done, and upon the last chart update by Dr. 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 She has generalized dystonia which has progressively worsened since she was diagnosed in 2016 (parents report she was normal prior to this), has not walked without assist since 2017. She is completely dependent for ADLs. Exam is very limited today due to being non-verbal, inability to follow commands, limited movement.  Though patient has dyskinesias with higher dose Sinemet, reported response to medication making caring for her easier but this response has waned over time. We will plan to wean Sinemet once she gets some benefit from DBS.     She has had some improvement on DBS settings but increasing today she had SE so changed contacts and she tolerated well.     Future plans include PT/OT once able and wheelchair evaluation.     Discussed with Dr. Shaikh eldridge: lorazepam refill- for UA will defer d/t patient condition, for the benzo contract mother signed.     Diagnoses and all orders for this visit:  Generalized dystonia    #DBS turned on today    You are going home on program 2 ((new settings)).     Program 1 is the program you came in on today and can always go back to if there are any concerns  about side effects.    Send me a [employer] message in 3-4 weeks to let me know how she is doing. At that time I will see if she stays on current program 2 OR tries program 3 ((higher settings than 2))      #Follow up in 8 weeks with me        Total time of 35 minutes for today's visit, of which 25 mins were spent with DBS programming as noted above-checking settings, stimulator events, energy level, and impedances and updating settings in the chart.      Mehdi Busby, NP-C  Adult/Gerontological Nurse Practitioner   [facility], Department of Neurology  [facility] [place]  [facility]  [address] [address]  Phone: [phone]  Fax: [phone]

## 2024-05-02 ENCOUNTER — APPOINTMENT (OUTPATIENT)
Dept: RADIOLOGY | Facility: HOSPITAL | Age: 29
End: 2024-05-02
Payer: MEDICAID

## 2024-05-02 ENCOUNTER — APPOINTMENT (OUTPATIENT)
Dept: NEUROLOGY | Facility: HOSPITAL | Age: 29
End: 2024-05-02
Payer: MEDICAID

## 2024-05-06 NOTE — PROGRESS NOTES
[facility]   Neurosurgery    Diagnosis  Eloise was seen today for post-op.  Diagnoses and all orders for this visit:  Dystonia      Patient Discussion/Summary  1) Today we discussed wound healing and signs and symptoms to watch for. We also discussed the possible yeast infection under the left arm, which needs to be watched carefully to ensure there is no spread to the incisions.     2) Regarding the programming, it looks as though Ms. Costa is already experiencing benefit on low settings of the device.     3) Follow-up with neurology for programming and follow-up with me in 1 month for repeat wound assessment.     Provider Impressions  28 y.o. female with generalized dystonia, with unclear etiology; also, with notable cognitive and emotional decline over the years; now mostly non-verbal with limited comprehension; severe dystonia of UE b/l and LE b/l, as well as trunk; now s/p b/l GPi DBS and L chest generator placement w/ Abbott on 2/22/24 by me. Pt is doing well with improvement in dystonia in b/l UE and LE, also with more facial expression and possibly increased vocal expression. Pt's family states that programming on high settings did get facial contraction, suggesting that the posterior directional leads may be an option for programming if needed. Of note, earlier this month the left axilla became extremely erythematous with exudate, concerning for yeast infx; they have been applying an antifungal cream, which has helped, though it is still not entirely resolved. Otherwise, no issues with wound healing or other.      History of Present Illness  Chief Complaint:   Chief Complaint   Patient presents with    Post-op     DBS          HPI: Eloise Costa is a 28 y.o. female with generalized dystonia, with unclear etiology; also, with notable cognitive and emotional decline over the years; now mostly non-verbal with limited comprehension; severe dystonia of UE b/l and LE b/l, as well as trunk. Prior to  surgery it was discussed that patient may benefit from DBS, but with unclear outcomes due to the unknown etiology; also, it would not improve cognition or emotional lability. Patient is now s/p b/l GPi DBS and L chest generator placement w/ Abbott on 2/22/24 by me. During postop visit on 3/22 pt was doing well regarding incisions healing. Patient recently had her second programming session with KEELY Harding, on 5/1. Patient presents today for follow up visit for wound check.     Patient is here today with her mother and father. They state that they notice progress with the stim in that she is a little looser in her arms and legs; they also notice more facial expression and possibly increased vocal expression. The have not had problems with the incision, however, earlier this month the left axilla became extremely erythematous with exudate, concerning for yeast infx; they have been applying an antifungal cream, which has helped, though it is still not entirely resolved.    ROS: As noted in HPI.      Previous History  Past Medical History:   Diagnosis Date    ADHD     Anxiety     Cerebellar degeneration (CMS-HCC)     Cognitive decline     Dystonia     Nonverbal     Panic attacks     Patient underweight     Seizure (Multi) 2021    grand mal    Systolic hypertension in child      Past Surgical History:   Procedure Laterality Date    CT HEAD WO IV CONTRAST  08/11/2023    Under anesthesia    MR BRAIN W AND WO CONTRAST  08/11/2023    Under anesthesia    NO PAST SURGERIES       Social History     Tobacco Use    Smoking status: Never    Smokeless tobacco: Never   Vaping Use    Vaping status: Never Used   Substance Use Topics    Alcohol use: Never    Drug use: Never     Family History   Problem Relation Name Age of Onset    No Known Problems Mother      Hyperlipidemia Father      Hyperlipidemia Sister       No Known Allergies  Current Outpatient Medications   Medication Instructions    baclofen (Lioresal) 10 mg tablet 1.5  "tablets, oral, 3 times daily    carbidopa-levodopa (Sinemet CR)  mg ER tablet 1 tablet, oral, 4 times daily    docusate sodium (COLACE) 100 mg, oral, 2 times daily    LORazepam (ATIVAN) 0.5 mg, oral, 2 times daily         Vitals  /73   Pulse 62   Resp 20   Ht 1.626 m (5' 4\")   Wt (!) 43.1 kg (95 lb)   BMI 16.31 kg/m²       Physical Exam  Incisions are healing very well; still scab on posterior auricular incsision; increased ROM with slight improvement in dystonic tone b/l UE and LE; very slight erythema in L axilla, improved compared to picture    Results                  "

## 2024-05-08 ENCOUNTER — OFFICE VISIT (OUTPATIENT)
Dept: NEUROSURGERY | Facility: HOSPITAL | Age: 29
End: 2024-05-08
Payer: MEDICAID

## 2024-05-08 VITALS
BODY MASS INDEX: 16.22 KG/M2 | RESPIRATION RATE: 20 BRPM | SYSTOLIC BLOOD PRESSURE: 106 MMHG | WEIGHT: 95 LBS | HEIGHT: 64 IN | DIASTOLIC BLOOD PRESSURE: 73 MMHG | HEART RATE: 62 BPM

## 2024-05-08 DIAGNOSIS — G24.9 DYSTONIA: Primary | ICD-10-CM

## 2024-05-08 PROCEDURE — 99024 POSTOP FOLLOW-UP VISIT: CPT | Performed by: NEUROLOGICAL SURGERY

## 2024-05-08 PROCEDURE — 3078F DIAST BP <80 MM HG: CPT | Performed by: NEUROLOGICAL SURGERY

## 2024-05-08 PROCEDURE — 3074F SYST BP LT 130 MM HG: CPT | Performed by: NEUROLOGICAL SURGERY

## 2024-05-08 PROCEDURE — 1036F TOBACCO NON-USER: CPT | Performed by: NEUROLOGICAL SURGERY

## 2024-05-10 ENCOUNTER — APPOINTMENT (OUTPATIENT)
Dept: NEUROSURGERY | Facility: CLINIC | Age: 29
End: 2024-05-10
Payer: MEDICAID

## 2024-05-22 ENCOUNTER — PATIENT MESSAGE (OUTPATIENT)
Dept: NEUROLOGY | Facility: CLINIC | Age: 29
End: 2024-05-22
Payer: MEDICAID

## 2024-06-06 NOTE — PROGRESS NOTES
[facility]   Neurosurgery    Diagnosis  Eloise was seen today for follow-up.  Diagnoses and all orders for this visit:  Dystonia  Status post deep brain stimulator placement      Patient Discussion/Summary  Today we discussed wound care and signs and symptoms to monitor.  Follow up with neurology for programming of your device.  Follow up with me as needed.     Provider Impressions  28 y.o. female with generalized dystonia, with unclear etiology; also, with notable cognitive and emotional decline over the years; now mostly non-verbal with limited comprehension; severe dystonia of UE b/l and LE b/l, as well as trunk; now s/p b/l GPi DBS and L chest generator placement w/ Abbott on 2/22/24 by me. Today patient is doing well with incisions nearly fully healed, and improvement in dystonia. Patient is scheduled for her next programming session with Mehdi on 6/19.       History of Present Illness  Chief Complaint:   Chief Complaint   Patient presents with    Follow-up     DBS         HPI: Eloise Costa is a 28 y.o. female with generalized dystonia, with unclear etiology; also, with notable cognitive and emotional decline over the years; now mostly non-verbal with limited comprehension; severe dystonia of UE b/l and LE b/l, as well as trunk; now s/p b/l GPi DBS and L chest generator placement w/ Abbott on 2/22/24 by me. During postop visit with me on 5/8 pt was doing well with improvement in dystonia in b/l UE and LE, also with more facial expression and possibly increased vocal expression. Pt's family reported that programming on high settings did get facial contraction, suggesting that the posterior directional leads may be an option for programming if needed. Of note, family reported that earlier in the month pt's left axilla became extremely erythematous with exudate, concerning for yeast infx, which they had been applying an antifungal cream and was helping, though it had not completely resolved.  "Otherwise, pt had no other issues with skin or wound healing. Patient presents today for follow up wound check and to discuss progress since last visit.    Patient's parents report that patient is doing overall well since the last visit, with incisions nearly fully healed. Patient is scheduled for her next programming session with Mehdi on 6/19.       ROS: As noted in HPI.      Previous History  Past Medical History:   Diagnosis Date    ADHD     Anxiety     Cerebellar degeneration (CMS-HCC)     Cognitive decline     Dystonia     Nonverbal     Panic attacks     Patient underweight     Seizure (Multi) 2021    grand mal    Systolic hypertension in child      Past Surgical History:   Procedure Laterality Date    CT HEAD WO IV CONTRAST  08/11/2023    Under anesthesia    MR BRAIN W AND WO CONTRAST  08/11/2023    Under anesthesia    NO PAST SURGERIES       Social History     Tobacco Use    Smoking status: Never    Smokeless tobacco: Never   Vaping Use    Vaping status: Never Used   Substance Use Topics    Alcohol use: Never    Drug use: Never     Family History   Problem Relation Name Age of Onset    No Known Problems Mother      Hyperlipidemia Father      Hyperlipidemia Sister       No Known Allergies  Current Outpatient Medications   Medication Instructions    baclofen (Lioresal) 10 mg tablet 1.5 tablets, oral, 3 times daily    carbidopa-levodopa (Sinemet CR)  mg ER tablet 1 tablet, oral, 4 times daily    docusate sodium (COLACE) 100 mg, oral, 2 times daily    LORazepam (ATIVAN) 0.5 mg, oral, 2 times daily         Vitals  /62   Pulse 76   Temp 36.2 °C (97.2 °F)   Resp 16   Ht 1.626 m (5' 4\")   Wt (!) 43.1 kg (95 lb)   BMI 16.31 kg/m²       Physical Exam  Well appearing, in no acute distress. B/l scalp and L clavicular incisions well healed, and L posterior auricular incision healing well with small scab, c/d/I. Tone in UE is modestly improved.                    "

## 2024-06-07 ENCOUNTER — OFFICE VISIT (OUTPATIENT)
Dept: NEUROSURGERY | Facility: CLINIC | Age: 29
End: 2024-06-07
Payer: MEDICAID

## 2024-06-07 VITALS
WEIGHT: 95 LBS | DIASTOLIC BLOOD PRESSURE: 62 MMHG | HEART RATE: 76 BPM | RESPIRATION RATE: 16 BRPM | TEMPERATURE: 97.2 F | BODY MASS INDEX: 16.22 KG/M2 | SYSTOLIC BLOOD PRESSURE: 100 MMHG | HEIGHT: 64 IN

## 2024-06-07 DIAGNOSIS — Z96.89 STATUS POST DEEP BRAIN STIMULATOR PLACEMENT: ICD-10-CM

## 2024-06-07 DIAGNOSIS — G24.9 DYSTONIA: Primary | ICD-10-CM

## 2024-06-07 PROCEDURE — 3078F DIAST BP <80 MM HG: CPT | Performed by: NEUROLOGICAL SURGERY

## 2024-06-07 PROCEDURE — 3074F SYST BP LT 130 MM HG: CPT | Performed by: NEUROLOGICAL SURGERY

## 2024-06-07 PROCEDURE — 99213 OFFICE O/P EST LOW 20 MIN: CPT | Performed by: NEUROLOGICAL SURGERY

## 2024-06-07 PROCEDURE — 1036F TOBACCO NON-USER: CPT | Performed by: NEUROLOGICAL SURGERY

## 2024-06-07 ASSESSMENT — PAIN SCALES - GENERAL: PAINLEVEL: 0-NO PAIN

## 2024-06-19 ENCOUNTER — APPOINTMENT (OUTPATIENT)
Dept: NEUROLOGY | Facility: CLINIC | Age: 29
End: 2024-06-19
Payer: MEDICAID

## 2024-06-19 VITALS — RESPIRATION RATE: 14 BRPM | HEART RATE: 82 BPM | DIASTOLIC BLOOD PRESSURE: 60 MMHG | SYSTOLIC BLOOD PRESSURE: 95 MMHG

## 2024-06-19 DIAGNOSIS — G24.9 GENERALIZED DYSTONIA: Primary | ICD-10-CM

## 2024-06-19 PROCEDURE — 95984 ALYS BRN NPGT PRGRMG ADDL 15: CPT | Performed by: NURSE PRACTITIONER

## 2024-06-19 PROCEDURE — 95983 ALYS BRN NPGT PRGRMG 15 MIN: CPT | Performed by: NURSE PRACTITIONER

## 2024-06-19 ASSESSMENT — ENCOUNTER SYMPTOMS
DEPRESSION: 0
LOSS OF SENSATION IN FEET: 0
OCCASIONAL FEELINGS OF UNSTEADINESS: 1

## 2024-06-19 ASSESSMENT — PATIENT HEALTH QUESTIONNAIRE - PHQ9
2. FEELING DOWN, DEPRESSED OR HOPELESS: NOT AT ALL
1. LITTLE INTEREST OR PLEASURE IN DOING THINGS: NOT AT ALL
SUM OF ALL RESPONSES TO PHQ9 QUESTIONS 1 AND 2: 0

## 2024-06-19 NOTE — PATIENT INSTRUCTIONS
#DBS adjusted today, 2 new programs with higher settings made.     Baseline is the program you came in on today--you can always go back if side effects     You are going home on program 1. After 3-4 weeks if tolerating without side effects, go to program 2    Do not try program 4 unless we discuss (saving if the others are not tolerated)    #Continue medications unchanged    Sinemet 25/100mg 0.5tabs 3 times a day  Baclofen 10mg 1.5tabs 3 times a day  Lorazepam 1mg 0.5 tab 2 times a day    #Follow up in 8 weeks with me    Mehdi Busby, NP-C  Adult/Gerontological Nurse Practitioner   [facility], Department of Neurology  [facility] [place]  [facility]  [address] [place] [address]  Phone: [phone]  Fax: [phone]

## 2024-06-19 NOTE — PROGRESS NOTES
Subjective     Eloise Costa is a 28 y.o. year old female who presents with dystonia, here for follow up visit.    HPI    Here for 3rd DBS programming.     She comes with her mother, dystonia improves then back tracks so not too much improvement.    No SE or any changes her mother is aware of.       Meds  Sinemet 25/100mg 0.5tabs 4 times a day (higher doses=anxious, crying and no benefit)--unsure if it helps so giving 2/3 in the am and afternoon and 6pm (3x/day)  Baclofen 10mg 1.5tabs 3 times a day  Lorazepam 1mg 0.5 tab 2 times a day    Not a lot of difference when taking Sinemet or not. Movement is more uncoordinated (not helpful) when she takes it.         Patient Health Questionnaire-2 Score: 0            Current Outpatient Medications:     baclofen (Lioresal) 10 mg tablet, Take 1.5 tablets (15 mg) by mouth 3 times a day., Disp: , Rfl:     carbidopa-levodopa (Sinemet CR)  mg ER tablet, Take 1 tablet by mouth 4 times a day., Disp: , Rfl:     docusate sodium (Colace) 100 mg capsule, Take 1 capsule (100 mg) by mouth 2 times a day., Disp: , Rfl:     LORazepam (Ativan) 1 mg tablet, Take 0.5 tablets (0.5 mg) by mouth 2 times a day., Disp: 90 tablet, Rfl: 0       Objective   Vitals:    06/19/24 0912   BP: 95/60   BP Location: Left arm   Patient Position: Sitting   BP Cuff Size: Adult   Pulse: 82   Resp: 14   Weight: Comment: wheelchair                   Physical Exam  R leaning  Dystonia in BUE and BLE- has some passive ROM but not much  BUE adducted across chest, R knee inversion  Non verbal, sitting up in wheelchair but awake and alert    DBS Procedure Visit  Abbott 2/22/24  IPG 2.96  TI: L-912  R-1075  SI: all OK      Initial Settings  Program 3:  L GPi  C+2- 3.0/70/90 ((1.5-3.0))  R GPi  C+10- 3.0/70/90 ((1.5-3.0))        Programming  Deleted old program 1&2    Increased PW and amp to 3.5 and 80 and developed head pulling forward and L facial twitching??---leans R but not usually with her head forward and  her mom can straighten her---turned off and back on and no SE and LUE moves better as well as RUE    Mother notes her lips do sometimes twitch so not really a side effect  She was off x 5 mins and had an episode of leaning forward without stimulation so unsure that is a side effect but head not usually in that position.     New program 1 is program 3 with higher settings  New program 2 is program 1 with higher settings    New program 4 created incase above is not tolerated---she felt looser to her mother  L GPi  C+2-3- 2.5/90/80/90  R GPi  C+10-11- 2.5/80/90      PLAN for future visits  Double monopolar with more dorsal contacts  Could try segments though ring usually used for dystonia      Final Settings  Program 1: active  L GPi  C+2- 3.5/80/90 ((0-3.0))  R GPi  C+10- 3.5/80/90 ((0-3.0))    Program 2:  L GPi  C+2- 3.5/90/90 ((0-3.5))  R GPi  C+10- 3.5/90/90 ((0-3.5))    Program 4  L GPi  C+2-3- 2.5/90/80/90 ((0-3.0))  R GPi  C+10-11- 2.5/80/90 ((0-3.0))      Program 3 (baseline)  L GPi  C+2- 3.0/70/90 ((1.5-3.0))  R GPi  C+10- 3.0/70/90 ((1.5-3.0))      --------------------Previous Programming------------------------  4/3/24  Initial monopolar review  Watching for SE/subtle changes, she has dystonic tremor in RLE and BUE when trying to move it, parents move extremities throughout review to see if easier since dressing/hygiene are issues for her    C+ /Freq 60 tried but charge density limit reached at 2.5 (no SE) so did traditional MPR with PW 60 and freq at 130,      L GPi   60/130  1- 0.5--RUE slightly looser, at 2.0 she is moving RLE, 2.5 moving RUE some   2- arm goes back to midline until 2.0--looser, 3.5 her RLE straightens more  3-3.0 tighter compared to 1&2 but having more movement in RUE and RLE  4-same, benefit throughout w/ easier lifting but still difficult to move compared to 1-2    W/ DBS off RUE becomes adducted again against her chest again but had improvement throughout above    R  GPi  60/130  9- 1.5 improved ROM LUE and continued to improve with higher setting 4.0 and her arm is no longer adducted to chest.   10- 2.0 she can lift her LUE much easier 3.5 SE neck dystonia/anterocollis and seemed different   11- 1.0 she has better ROM in LUE and 4.0 good ROM to LUE  12- 2.0 better ROM LUE but still difficult      Final settings:  Program 1: ACTIVE   L GPi  C+1- 2.0/60/130 ((0-2.0))  R GPi  C+9- 2.0/60/130 ((0-2.0))      Program 2:  L GPi  C+2- 2.5/60/130  R GPi  C+10- 2.5/60/130 ((0-2.5))    5/1/24  Initial Settings  Program 1: ACTIVE   L GPi  C+1- 2.0/60/130 ((0-2.0))  R GPi  C+9- 2.0/60/130 ((0-2.0))     Programming  Copied 1 to 3 and increased settings to amp 2.5 and PW 90-ROM in arms is better but severe neck pulling. Turned off and improved and back on and re-occurred.      Program 2 tried without SE---copied this to program 3 and increased amp to 3.0 and PW to 80 on both sides---SE R facial twitching and neck pulling. Lowered amp in L GPi to 2.5 and PW 80 bilaterally and no SE---again, ROM is better per her mother. Then neck pulls again down to the R---lowered freq to 90 both sides and was able to increase R GPi amp back to 3.0     However, mother also notes that regardless of DBS mouth sometimes twitches AND her head pulls R but these seemed more severe.  Also, whenever she speaks her arms and legs cross and she straightens when she is more excited.         Program 1- most ventral contact--side effect with higher amp and PW so kept settings unchanged  Program 2- 2nd most ventral contact with some further improvement today  New program 3--higher settings than program 2 well tolerated      Final Settings  Program 2  C+2- 2.5/60/130  R GPi  C+10- 2.5/60/130 ((0-2.5))     Program 3:  C+2- 3.0/70/90 ((1.5-3.0))  R GPi  C+10- 3.0/70/90 ((1.5-3.0))        Assessment/Plan   Ms. Eloise Costa is a 28 y.o. female who presents for initial DBS visit for dystonia. She is s/p b/l GPi DBS and L chest  generator placement w/ Abbott on 2/22/24. Genetic testing was done, and upon the last chart update by Dr. 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 She has generalized dystonia which has progressively worsened since she was diagnosed in 2016 (parents report she was normal prior to this), has not walked without assist since 2017. She is completely dependent for ADLs. Exam is very limited today due to being non-verbal, inability to follow commands, limited movement.  Though patient has dyskinesias with higher dose Sinemet, reported response to medication making caring for her easier but this response has waned over time and her mother has been decreasing dose. We will plan to wean Sinemet once she gets some benefit from DBS.     We discussed slower programming as she is unable to tell us if she feels a side effect or unwell and would have to go off of how her mom interacts with her. Several new programs created for this reason.     Future plans include PT/OT once able and wheelchair evaluation.       Diagnoses and all orders for this visit:  Generalized dystonia      #DBS adjusted today, 2 new programs with higher settings made.     Baseline is the program you came in on today--you can always go back if side effects     You are going home on program 1. After 3-4 weeks if tolerating without side effects, go to program 2    Do not try program 4 unless we discuss (saving if the others are not tolerated)    #Continue medications unchanged    Sinemet 25/100mg 0.5tabs 3 times a day  Baclofen 10mg 1.5tabs 3 times a day  Lorazepam 1mg 0.5 tab 2 times a day    #Follow up in 8 weeks with me      Total time of 35 minutes for today's visit, of which 30 mins were spent with DBS programming as noted above-checking settings, stimulator events, energy level, and impedances and updating settings in the chart.      Mehdi Busby, NP-C  Adult/Gerontological Nurse Practitioner    [facility], Department of Neurology  [facility] [place]  [facility]  [address] [place] [address]  Phone: [phone]  Fax: [phone]

## 2024-06-21 ENCOUNTER — TELEPHONE (OUTPATIENT)
Dept: NEUROLOGY | Facility: HOSPITAL | Age: 29
End: 2024-06-21
Payer: MEDICAID

## 2024-06-21 NOTE — TELEPHONE ENCOUNTER
I contracted [employer] and they want verbal orders for incontinence pads. Have Dr Navarro on file as a provider. I let them know that we are the pts neurology office and I can find no orders from our team for incontinence supplies. [facility] will reach back out to the family to figure out what provider covers these supplies.

## 2024-06-26 ENCOUNTER — APPOINTMENT (OUTPATIENT)
Dept: NEUROLOGY | Facility: CLINIC | Age: 29
End: 2024-06-26
Payer: MEDICAID

## 2024-07-10 DIAGNOSIS — F41.9 ANXIETY: ICD-10-CM

## 2024-07-10 DIAGNOSIS — G24.9 GENERALIZED DYSTONIA: ICD-10-CM

## 2024-07-10 RX ORDER — LORAZEPAM 1 MG/1
0.5 TABLET ORAL 2 TIMES DAILY
Qty: 90 TABLET | Refills: 0 | Status: SHIPPED | OUTPATIENT
Start: 2024-07-10

## 2024-08-01 DIAGNOSIS — G24.9 GENERALIZED DYSTONIA: Primary | ICD-10-CM

## 2024-08-01 RX ORDER — BACLOFEN 10 MG/1
15 TABLET ORAL 3 TIMES DAILY
Qty: 540 TABLET | Refills: 1 | Status: SHIPPED | OUTPATIENT
Start: 2024-08-01

## 2024-08-01 RX ORDER — CARBIDOPA AND LEVODOPA 25; 100 MG/1; MG/1
1 TABLET, EXTENDED RELEASE ORAL 4 TIMES DAILY
Qty: 360 TABLET | Refills: 1 | Status: SHIPPED | OUTPATIENT
Start: 2024-08-01

## 2024-08-14 ENCOUNTER — APPOINTMENT (OUTPATIENT)
Dept: NEUROLOGY | Facility: CLINIC | Age: 29
End: 2024-08-14
Payer: MEDICAID

## 2024-08-14 VITALS — SYSTOLIC BLOOD PRESSURE: 106 MMHG | HEART RATE: 82 BPM | DIASTOLIC BLOOD PRESSURE: 70 MMHG | RESPIRATION RATE: 14 BRPM

## 2024-08-14 DIAGNOSIS — G24.9 GENERALIZED DYSTONIA: Primary | ICD-10-CM

## 2024-08-14 PROCEDURE — 3074F SYST BP LT 130 MM HG: CPT | Performed by: NURSE PRACTITIONER

## 2024-08-14 PROCEDURE — 1036F TOBACCO NON-USER: CPT | Performed by: NURSE PRACTITIONER

## 2024-08-14 PROCEDURE — 3078F DIAST BP <80 MM HG: CPT | Performed by: NURSE PRACTITIONER

## 2024-08-14 RX ORDER — CARBIDOPA AND LEVODOPA 25; 100 MG/1; MG/1
0.5 TABLET ORAL 3 TIMES DAILY
Qty: 135 TABLET | Refills: 3 | Status: SHIPPED | OUTPATIENT
Start: 2024-08-14

## 2024-08-14 ASSESSMENT — PATIENT HEALTH QUESTIONNAIRE - PHQ9
SUM OF ALL RESPONSES TO PHQ9 QUESTIONS 1 AND 2: 0
2. FEELING DOWN, DEPRESSED OR HOPELESS: NOT AT ALL
1. LITTLE INTEREST OR PLEASURE IN DOING THINGS: NOT AT ALL

## 2024-08-14 ASSESSMENT — ENCOUNTER SYMPTOMS
OCCASIONAL FEELINGS OF UNSTEADINESS: 0
DEPRESSION: 0
LOSS OF SENSATION IN FEET: 0

## 2024-08-14 NOTE — PATIENT INSTRUCTIONS
#If dystonia improves much more, let me know and we can do occupational therapy consult in the home    Also wheelchair eval planned for the future    #Decrease Carbidopa-levodopa 25/100mg to 0.5 tabs 2 times a day    See how dystonia is, if worsens, OK to go back to 3 times a day. If after 3 weeks no difference, go to once a day, stay there and assess for 3 weeks and if no difference then stop.     #DBS adjusted today      Baseline is the program you came in on today is program 2--you can always go back if side effects     You are going home on program 1--higher settings than program 2. After 4 weeks if tolerating without side effects, go to program 3.    Do not try program 4 unless we discuss (saving if the others are not tolerated)    #Continue medications unchanged  Baclofen 10mg 1.5tabs 3 times a day  Lorazepam 1mg 0.5 tab 2 times a day    #Follow up in 8 weeks with me    Mehdi Busby, NP-C  Adult/Gerontological Nurse Practitioner   [facility], Department of Neurology  [facility] [place]  [facility]  [address]  [place] [id number]  Phone: [phone]  Fax: [phone]

## 2024-08-14 NOTE — PROGRESS NOTES
Subjective     Eloise Costa is a 28 y.o. year old female who presents with dystonia, here for follow up visit.    HPI    Here for 4th DBS programming.     Continues on program 1 and settings were increased. No SE.    Defintely looser when she moves her and keeps her arms crossed--Able to pick her arms up to clean under arms, picks her feet up better to transfer her    No falls, no new PMH.    Meds  Sinemet 25/100mg 0.5tabs 4 times a day (higher doses=anxious, crying and no benefit)--unsure if it helps so giving 2/3 in the am and afternoon and 6pm (3x/day)  Baclofen 10mg 1.5tabs 3 times a day  Lorazepam 1mg 0.5 tab 2 times a day    Not a lot of difference when taking Sinemet or not. Movement is more uncoordinated (not helpful) when she takes it.         Patient Health Questionnaire-2 Score: 0            Current Outpatient Medications:     baclofen (Lioresal) 10 mg tablet, Take 1.5 tablets (15 mg) by mouth 3 times a day., Disp: 540 tablet, Rfl: 1    carbidopa-levodopa (Sinemet)  mg tablet, Take 0.5 tablets by mouth 3 times a day., Disp: 135 tablet, Rfl: 3    docusate sodium (Colace) 100 mg capsule, Take 1 capsule (100 mg) by mouth 2 times a day., Disp: , Rfl:     LORazepam (Ativan) 1 mg tablet, Take 0.5 tablets (0.5 mg) by mouth 2 times a day., Disp: 90 tablet, Rfl: 0       Objective   Vitals:    08/14/24 0812   BP: 106/70   BP Location: Left arm   Patient Position: Sitting   BP Cuff Size: Adult   Pulse: 82   Resp: 14   Weight: Comment: wheelchair               Physical Exam  R leaning  Dystonia in BUE and BLE- has some passive ROM but not much  BUE adducted across chest-her mother is able to do PROM easier, R knee inversion  Non verbal, sitting up in wheelchair but awake and alert    DBS Procedure Visit  [facility] 2/22/24  IPG 2.94  TI: L-850  R-[id number]  SI: all OK      Initial Settings  Program 2:  L GPi  C+2- 3.5/90/90 ((0-3.5))  R GPi  C+10- 3.5/90/90 ((0-3.5))      Programming  Deleted program 1&2 as were lower  settings than current program 2    Made new program 1 which is higher settings (increased PW) and program 3 which is higher settings than 1 (higher PW)---she tolerated program 3 for 10 mins without SE      PLAN for future visits  Double monopolar with more dorsal contacts  Could try segments though ring usually used for dystonia      Final Settings  Program 1- ACTIVE  L GPi  C+2- 3.5/110/90 ((0-3.5))  R GPi  C+10- 3.5/110/90 ((0-3.5))    Program 2 ((now is her baseline))  L GPi  C+2- 3.5/90/90 ((0-3.5))  R GPi  C+10- 3.5/90/90 ((0-3.5))    Program 3  L GPi  C+2- 3.5/130/90 ((0-3.5))  R GPi  C+10- 3.5/130/90 ((0-3.5))    Program 4  L GPi  C+2-3- 2.5/90/80/90 ((0-3.0))  R GPi  C+10-11- 2.5/80/90 ((0-3.0))        --------------------Previous Programming------------------------  4/3/24  Initial monopolar review  Watching for SE/subtle changes, she has dystonic tremor in RLE and BUE when trying to move it, parents move extremities throughout review to see if easier since dressing/hygiene are issues for her    C+ /Freq 60 tried but charge density limit reached at 2.5 (no SE) so did traditional MPR with PW 60 and freq at 130,      L GPi   60/130  1- 0.5--RUE slightly looser, at 2.0 she is moving RLE, 2.5 moving RUE some   2- arm goes back to midline until 2.0--looser, 3.5 her RLE straightens more  3-3.0 tighter compared to 1&2 but having more movement in RUE and RLE  4-same, benefit throughout w/ easier lifting but still difficult to move compared to 1-2    W/ DBS off RUE becomes adducted again against her chest again but had improvement throughout above    R GPi  60/130  9- 1.5 improved ROM LUE and continued to improve with higher setting 4.0 and her arm is no longer adducted to chest.   10- 2.0 she can lift her LUE much easier 3.5 SE neck dystonia/anterocollis and seemed different   11- 1.0 she has better ROM in LUE and 4.0 good ROM to LUE  12- 2.0 better ROM LUE but still difficult      Final settings:  Program 1:  ACTIVE   L GPi  C+1- 2.0/60/130 ((0-2.0))  R GPi  C+9- 2.0/60/130 ((0-2.0))      Program 2:  L GPi  C+2- 2.5/60/130  R GPi  C+10- 2.5/60/130 ((0-2.5))    5/1/24  Initial Settings  Program 1: ACTIVE   L GPi  C+1- 2.0/60/130 ((0-2.0))  R GPi  C+9- 2.0/60/130 ((0-2.0))     Programming  Copied 1 to 3 and increased settings to amp 2.5 and PW 90-ROM in arms is better but severe neck pulling. Turned off and improved and back on and re-occurred.      Program 2 tried without SE---copied this to program 3 and increased amp to 3.0 and PW to 80 on both sides---SE R facial twitching and neck pulling. Lowered amp in L GPi to 2.5 and PW 80 bilaterally and no SE---again, ROM is better per her mother. Then neck pulls again down to the R---lowered freq to 90 both sides and was able to increase R GPi amp back to 3.0     However, mother also notes that regardless of DBS mouth sometimes twitches AND her head pulls R but these seemed more severe.  Also, whenever she speaks her arms and legs cross and she straightens when she is more excited.         Program 1- most ventral contact--side effect with higher amp and PW so kept settings unchanged  Program 2- 2nd most ventral contact with some further improvement today  New program 3--higher settings than program 2 well tolerated      Final Settings  Program 2  C+2- 2.5/60/130  R GPi  C+10- 2.5/60/130 ((0-2.5))     Program 3:  C+2- 3.0/70/90 ((1.5-3.0))  R GPi  C+10- 3.0/70/90 ((1.5-3.0))    6/19/24  Initial Settings  Program 3:  L GPi  C+2- 3.0/70/90 ((1.5-3.0))  R GPi  C+10- 3.0/70/90 ((1.5-3.0))    Deleted old program 1&2    Increased PW and amp to 3.5 and 80 and developed head pulling forward and L facial twitching??---leans R but not usually with her head forward and her mom can straighten her---turned off and back on and no SE and LUE moves better as well as RUE    Mother notes her lips do sometimes twitch so not really a side effect  She was off x 5 mins and had an episode of leaning  forward without stimulation so unsure that is a side effect but head not usually in that position.     New program 1 is program 3 with higher settings  New program 2 is program 1 with higher settings    New program 4 created incase above is not tolerated---she felt looser to her mother  L GPi  C+2-3- 2.5/90/80/90  R GPi  C+10-11- 2.5/80/90    Final Settings  Program 1: active  L GPi  C+2- 3.5/80/90 ((0-3.0))  R GPi  C+10- 3.5/80/90 ((0-3.0))    Program 2:  L GPi  C+2- 3.5/90/90 ((0-3.5))  R GPi  C+10- 3.5/90/90 ((0-3.5))    Program 4  L GPi  C+2-3- 2.5/90/80/90 ((0-3.0))  R GPi  C+10-11- 2.5/80/90 ((0-3.0))      Program 3 (baseline)  L GPi  C+2- 3.0/70/90 ((1.5-3.0))  R GPi  C+10- 3.0/70/90 ((1.5-3.0))        Assessment/Plan   Ms. Eloise Costa is a 28 y.o. female who presents for initial DBS visit for dystonia. She is s/p b/l GPi DBS and L chest generator placement w/ [facility] on 2/22/24. Genetic testing was done, and upon the last chart update by Dr. 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 She has generalized dystonia which has progressively worsened since she was diagnosed in 2016 (parents report she was normal prior to this), has not walked without assist since 2017. She is completely dependent for ADLs. Exam is very limited today due to being non-verbal, inability to follow commands, limited movement.  Though patient has dyskinesias with higher dose Sinemet, reported response to medication making caring for her easier but this response has waned over time and her mother has been decreasing dose, as only increasing current DBS settings without changing configuration will wean Sinemet to see if dystonia worsens.     We will continue slower programming as she is unable to tell us if she feels a side effect or unwell and would have to go off of how her mom interacts with her, will try 2 new programs before next visit with higher settings to see if  improvement.     Future plans include OT once able and wheelchair evaluation.       Diagnoses and all orders for this visit:  Generalized dystonia  -     carbidopa-levodopa (Sinemet)  mg tablet; Take 0.5 tablets by mouth 3 times a day.        #If dystonia improves much more, let me know and we can do occupational therapy consult in the home    Also wheelchair eval planned for the future    #Decrease Carbidopa-levodopa 25/100mg to 0.5 tabs 2 times a day    See how dystonia is, if worsens, OK to go back to 3 times a day. If after 3 weeks no difference, go to once a day, stay there and assess for 3 weeks and if no difference then stop.     #DBS adjusted today      Baseline is the program you came in on today is program 2--you can always go back if side effects     You are going home on program 1--higher settings than program 2. After 4 weeks if tolerating without side effects, go to program 3.    Do not try program 4 unless we discuss (saving if the others are not tolerated)    #Continue medications unchanged  Baclofen 10mg 1.5tabs 3 times a day  Lorazepam 1mg 0.5 tab 2 times a day    #Follow up in 8 weeks with me      Total time of 28 minutes for today's visit, of which 10 mins were spent with DBS programming as noted above-checking settings, stimulator events, energy level, and impedances and updating settings in the chart.    Mehdi Busby, NP-C  Adult/Gerontological Nurse Practitioner   [facility], Department of Neurology  [facility] [place]  [facility]  [address] [place] [address]  Phone: [phone]  Fax: [phone]

## 2024-08-30 DIAGNOSIS — F41.9 ANXIETY: ICD-10-CM

## 2024-08-30 DIAGNOSIS — G24.9 GENERALIZED DYSTONIA: ICD-10-CM

## 2024-08-30 RX ORDER — LORAZEPAM 1 MG/1
0.5 TABLET ORAL 2 TIMES DAILY
Qty: 90 TABLET | Refills: 0 | Status: CANCELLED | OUTPATIENT
Start: 2024-08-30

## 2024-08-30 NOTE — TELEPHONE ENCOUNTER
Bouchra called today. She was wondering if you will manage long term, Natalies' need for incontinence supplies. I asked her to have the DME company to send the request to [phone]. No call back requested.

## 2024-10-09 ENCOUNTER — APPOINTMENT (OUTPATIENT)
Dept: NEUROLOGY | Facility: CLINIC | Age: 29
End: 2024-10-09
Payer: MEDICAID

## 2024-10-09 VITALS — RESPIRATION RATE: 18 BRPM | HEART RATE: 79 BPM | SYSTOLIC BLOOD PRESSURE: 108 MMHG | DIASTOLIC BLOOD PRESSURE: 66 MMHG

## 2024-10-09 DIAGNOSIS — G24.9 GENERALIZED DYSTONIA: Primary | ICD-10-CM

## 2024-10-09 PROCEDURE — 95983 ALYS BRN NPGT PRGRMG 15 MIN: CPT | Performed by: NURSE PRACTITIONER

## 2024-10-09 ASSESSMENT — ENCOUNTER SYMPTOMS
LOSS OF SENSATION IN FEET: 0
DEPRESSION: 0
OCCASIONAL FEELINGS OF UNSTEADINESS: 0

## 2024-10-09 NOTE — PATIENT INSTRUCTIONS
#If dystonia improves much more, let me know and we can do occupational therapy consult in the home    Also wheelchair eval planned for the future    #DBS adjusted today      Baseline is the program you came in on today is program 3--you can always go back if side effects on any other programs    But lets stay on program 3 x 1 week (4 weeks total). If no changes, go to program 4 (higher settings) x 4 weeks, if not improving but no SE, go to program 1 (higher settings than program 4)    #Continue medications unchanged  Carbidopa-levodopa 25/100mg to 0.5 tabs 2 times a day  Baclofen 10mg 1.5tabs 3 times a day  Lorazepam 1mg 0.5 tab 2 times a day    #Follow up in 10 weeks with me--virtually for DBS visit      DERRELL GarberC  Adult/Gerontological Nurse Practitioner   [facility], Department of Neurology  [facility] [place]  [facility]  [address] [place] [address]  Phone: [phone]  Fax: [phone]

## 2024-10-09 NOTE — PROGRESS NOTES
Subjective     Eloise Costa is a 28 y.o. year old female who presents with dystonia, here for follow up visit.    HPI    Here for 5th DBS programming with her mother.     Looser when she first gets programmed and then tightens up again. Still able to clean under her arms.   Big help that she is also eating better/chewing more.  Now also doing better picking up her feet.    Arms crossed across her body is the main issue but her mother is happy they can get under her arms to clean her.   Still leaning  L>R    Continues on program 3 x 3 weeks.   No SE, not sure of benefit.       No falls, no new PMH.    Meds  Sinemet 25/100mg 0.5tabs 2 imes a day (higher doses than 0.5 QID=anxious, crying and no benefit)  Baclofen 10mg 1.5tabs 3 times a day  Lorazepam 1mg 0.5 tab 2 times a day    Not a lot of difference when taking Sinemet or not. Movement is more uncoordinated (not helpful) when she takes it.         Patient Health Questionnaire-2 Score: 0            Current Outpatient Medications:     baclofen (Lioresal) 10 mg tablet, Take 1.5 tablets (15 mg) by mouth 3 times a day., Disp: 540 tablet, Rfl: 1    carbidopa-levodopa (Sinemet)  mg tablet, Take 0.5 tablets by mouth 3 times a day., Disp: 135 tablet, Rfl: 3    docusate sodium (Colace) 100 mg capsule, Take 1 capsule (100 mg) by mouth 2 times a day., Disp: , Rfl:     LORazepam (Ativan) 1 mg tablet, Take 0.5 tablets (0.5 mg) by mouth 2 times a day., Disp: 90 tablet, Rfl: 0       Objective   Vitals:    10/09/24 0851   BP: 108/66   BP Location: Left arm   Patient Position: Sitting   BP Cuff Size: Small adult   Pulse: 79   Resp: 18   Weight: Comment: wheelchair               Physical Exam  R leaning  Dystonia in BUE and BLE- has some passive ROM but not much  R wrist flexion>L wrist flexion with fingers curled  BUE adducted across chest-her mother is able to do PROM easier, R knee inversion  Non verbal, sitting up in wheelchair but awake and alert    DBS Procedure  Visit  [facility] 2/22/24  IPG 2.94  TI: L-837  R-937  SI: all OK    Initial Settings  Program 3  L GPi  C+2- 3.5/130/90 ((0-3.5))  R GPi  C+10- 3.5/130/90 ((0-3.5))      Programming  Slowly increased settings watching for change/mother monitoring    Deleted program 1&2 as were lower settings than current program 2    Made new program 1 which is higher settings (increased PW) and program 3 which is higher settings than 1 (higher PW)---she tolerated program 3 for 10 mins without SE    Switched to program 4---higher settings, deleted program 1&2 which are lower settings than program 3    Program 1--program 4 but higher settings  Program 4  L GPi  C+2-3- 2.75/100/90 ((0-3.0))  R GPi  C+10-11- 2.75/100/90 ((0-3.0))      PLAN for future visits  Double monopolar with more dorsal contacts ((3-, 4- and 11-,12-)  Could try segments though ring usually used for dystonia      Final Settings  Program 3 ((now baseline))  L GPi  C+2- 3.5/130/90 ((0-3.5))  R GPi  C+10- 3.5/130/90 ((0-3.5))    Program 4  L GPi  C+2-3- 2.5/90/80/90 ((0-3.0))  R GPi  C+10-11- 2.5/80/90 ((0-3.0))    Program 1--program 4 but higher settings  L GPi  C+2-3- 2.75/100/90 ((0-3.0))  R GPi  C+10-11- 2.75/100/90 ((0-3.0))        --------------------Previous Programming------------------------  4/3/24  Initial monopolar review  Watching for SE/subtle changes, she has dystonic tremor in RLE and BUE when trying to move it, parents move extremities throughout review to see if easier since dressing/hygiene are issues for her    C+ /Freq 60 tried but charge density limit reached at 2.5 (no SE) so did traditional MPR with PW 60 and freq at 130,      L GPi   60/130  1- 0.5--RUE slightly looser, at 2.0 she is moving RLE, 2.5 moving RUE some   2- arm goes back to midline until 2.0--looser, 3.5 her RLE straightens more  3-3.0 tighter compared to 1&2 but having more movement in RUE and RLE  4-same, benefit throughout w/ easier lifting but still difficult to move  compared to 1-2    W/ DBS off RUE becomes adducted again against her chest again but had improvement throughout above    R GPi  60/130  9- 1.5 improved ROM LUE and continued to improve with higher setting 4.0 and her arm is no longer adducted to chest.   10- 2.0 she can lift her LUE much easier 3.5 SE neck dystonia/anterocollis and seemed different   11- 1.0 she has better ROM in LUE and 4.0 good ROM to LUE  12- 2.0 better ROM LUE but still difficult      Final settings:  Program 1: ACTIVE   L GPi  C+1- 2.0/60/130 ((0-2.0))  R GPi  C+9- 2.0/60/130 ((0-2.0))      Program 2:  L GPi  C+2- 2.5/60/130  R GPi  C+10- 2.5/60/130 ((0-2.5))    5/1/24  Initial Settings  Program 1: ACTIVE   L GPi  C+1- 2.0/60/130 ((0-2.0))  R GPi  C+9- 2.0/60/130 ((0-2.0))     Programming  Copied 1 to 3 and increased settings to amp 2.5 and PW 90-ROM in arms is better but severe neck pulling. Turned off and improved and back on and re-occurred.      Program 2 tried without SE---copied this to program 3 and increased amp to 3.0 and PW to 80 on both sides---SE R facial twitching and neck pulling. Lowered amp in L GPi to 2.5 and PW 80 bilaterally and no SE---again, ROM is better per her mother. Then neck pulls again down to the R---lowered freq to 90 both sides and was able to increase R GPi amp back to 3.0     However, mother also notes that regardless of DBS mouth sometimes twitches AND her head pulls R but these seemed more severe.  Also, whenever she speaks her arms and legs cross and she straightens when she is more excited.         Program 1- most ventral contact--side effect with higher amp and PW so kept settings unchanged  Program 2- 2nd most ventral contact with some further improvement today  New program 3--higher settings than program 2 well tolerated      Final Settings  Program 2  C+2- 2.5/60/130  R GPi  C+10- 2.5/60/130 ((0-2.5))     Program 3:  C+2- 3.0/70/90 ((1.5-3.0))  R GPi  C+10- 3.0/70/90 ((1.5-3.0))    6/19/24  Initial  Settings  Program 3:  L GPi  C+2- 3.0/70/90 ((1.5-3.0))  R GPi  C+10- 3.0/70/90 ((1.5-3.0))    Deleted old program 1&2    Increased PW and amp to 3.5 and 80 and developed head pulling forward and L facial twitching??---leans R but not usually with her head forward and her mom can straighten her---turned off and back on and no SE and LUE moves better as well as RUE    Mother notes her lips do sometimes twitch so not really a side effect  She was off x 5 mins and had an episode of leaning forward without stimulation so unsure that is a side effect but head not usually in that position.     New program 1 is program 3 with higher settings  New program 2 is program 1 with higher settings    New program 4 created incase above is not tolerated---she felt looser to her mother  L GPi  C+2-3- 2.5/90/80/90  R GPi  C+10-11- 2.5/80/90    Final Settings  Program 1: active  L GPi  C+2- 3.5/80/90 ((0-3.0))  R GPi  C+10- 3.5/80/90 ((0-3.0))    Program 2:  L GPi  C+2- 3.5/90/90 ((0-3.5))  R GPi  C+10- 3.5/90/90 ((0-3.5))    Program 4  L GPi  C+2-3- 2.5/90/80/90 ((0-3.0))  R GPi  C+10-11- 2.5/80/90 ((0-3.0))    Program 3 (baseline)  L GPi  C+2- 3.0/70/90 ((1.5-3.0))  R GPi  C+10- 3.0/70/90 ((1.5-3.0))    8/14/24  Initial Settings  Program 2:  L GPi  C+2- 3.5/90/90 ((0-3.5))  R GPi  C+10- 3.5/90/90 ((0-3.5))        Programming  Deleted program 1&2 as were lower settings than current program 2     Made new program 1 which is higher settings (increased PW) and program 3 which is higher settings than 1 (higher PW)---she tolerated program 3 for 10 mins without SE        PLAN for future visits  Double monopolar with more dorsal contacts  Could try segments though ring usually used for dystonia        Final Settings  Program 1- ACTIVE  L GPi  C+2- 3.5/110/90 ((0-3.5))  R GPi  C+10- 3.5/110/90 ((0-3.5))     Program 2 ((now is her baseline))  L GPi  C+2- 3.5/90/90 ((0-3.5))  R GPi  C+10- 3.5/90/90 ((0-3.5))     Program 3  L GPi  C+2-  3.5/130/90 ((0-3.5))  R GPi  C+10- 3.5/130/90 ((0-3.5))     Program 4  L GPi  C+2-3- 2.5/90/80/90 ((0-3.0))  R GPi  C+10-11- 2.5/80/90 ((0-3.0))        Assessment/Plan   Ms. Eloise Costa is a 28 y.o. female with generalized dystonia who presents for initial DBS visit for dystonia. She is s/p b/l GPi DBS and L chest generator placement w/ [facility] on 2/22/24. She has had mild improvement, her mother can now wash under her arms easier and patient is chewing better. Will continue to adjust stimulation as tolerated.    Though patient has dyskinesias with higher dose Sinemet, reported response to medication making caring for her easier but this response has waned over time and her mother has been decreasing dose--will continue at low dose for now and plan to wean as tolerated.     Past hx:  Genetic testing was done, and upon the last chart update by Dr. 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 She has generalized dystonia which has progressively worsened since she was diagnosed in 2016 (parents report she was normal prior to this), has not walked without assist since 2017. She is completely dependent for ADLs. Exam is very limited today due to being non-verbal, inability to follow commands, limited movement.     Future plans include OT once able and wheelchair evaluation.       Diagnoses and all orders for this visit:  Generalized dystonia      #If dystonia improves much more, let me know and we can do occupational therapy consult in the home    Also wheelchair eval planned for the future    #DBS adjusted today      Baseline is the program you came in on today is program 3--you can always go back if side effects on any other programs    But lets stay on program 3 x 1 week (4 weeks total). If no changes, go to program 4 (higher settings) x 4 weeks, if not improving but no SE, go to program 1 (higher settings than program 4)    #Continue medications  unchanged  Carbidopa-levodopa 25/100mg to 0.5 tabs 2 times a day  Baclofen 10mg 1.5tabs 3 times a day  Lorazepam 1mg 0.5 tab 2 times a day    #Follow up in 10 weeks with me--virtually for DBS visit      Total time of 25 minutes for today's visit, of which 15 mins were spent with DBS programming as noted above-checking settings, stimulator events, energy level, and impedances and updating settings in the chart.    Mehdi Busby NP-C  Adult/Gerontological Nurse Practitioner   [facility], Department of Neurology  [facility] [place]  [facility]  [address] [id number]  Phone: [phone]  Fax: [phone]

## 2024-10-16 DIAGNOSIS — F41.9 ANXIETY: ICD-10-CM

## 2024-10-16 DIAGNOSIS — G24.9 GENERALIZED DYSTONIA: ICD-10-CM

## 2024-10-16 RX ORDER — LORAZEPAM 1 MG/1
TABLET ORAL 2 TIMES DAILY
Qty: 90 TABLET | Refills: 0 | Status: SHIPPED | OUTPATIENT
Start: 2024-10-16

## 2024-11-15 ENCOUNTER — TELEPHONE (OUTPATIENT)
Dept: NEUROLOGY | Facility: CLINIC | Age: 29
End: 2024-11-15
Payer: MEDICAID

## 2024-11-15 ENCOUNTER — APPOINTMENT (OUTPATIENT)
Dept: RADIOLOGY | Facility: HOSPITAL | Age: 29
End: 2024-11-15
Payer: MEDICAID

## 2024-11-15 ENCOUNTER — HOSPITAL ENCOUNTER (EMERGENCY)
Facility: HOSPITAL | Age: 29
Discharge: HOME | End: 2024-11-15
Payer: MEDICAID

## 2024-11-15 VITALS
HEART RATE: 81 BPM | TEMPERATURE: 97.9 F | SYSTOLIC BLOOD PRESSURE: 110 MMHG | BODY MASS INDEX: 16.73 KG/M2 | RESPIRATION RATE: 14 BRPM | WEIGHT: 98 LBS | HEIGHT: 64 IN | DIASTOLIC BLOOD PRESSURE: 59 MMHG | OXYGEN SATURATION: 94 %

## 2024-11-15 DIAGNOSIS — R05.1 ACUTE COUGH: ICD-10-CM

## 2024-11-15 DIAGNOSIS — R56.9 SEIZURE (MULTI): Primary | ICD-10-CM

## 2024-11-15 LAB
ALBUMIN SERPL BCP-MCNC: 4.3 G/DL (ref 3.4–5)
ALP SERPL-CCNC: 85 U/L (ref 33–110)
ALT SERPL W P-5'-P-CCNC: 8 U/L (ref 7–45)
ANION GAP SERPL CALC-SCNC: 14 MMOL/L (ref 10–20)
AST SERPL W P-5'-P-CCNC: 24 U/L (ref 9–39)
BASOPHILS # BLD AUTO: 0.05 X10*3/UL (ref 0–0.1)
BASOPHILS NFR BLD AUTO: 0.5 %
BILIRUB SERPL-MCNC: 0.5 MG/DL (ref 0–1.2)
BUN SERPL-MCNC: 13 MG/DL (ref 6–23)
CALCIUM SERPL-MCNC: 9 MG/DL (ref 8.6–10.3)
CHLORIDE SERPL-SCNC: 102 MMOL/L (ref 98–107)
CO2 SERPL-SCNC: 25 MMOL/L (ref 21–32)
CREAT SERPL-MCNC: 0.57 MG/DL (ref 0.5–1.05)
EGFRCR SERPLBLD CKD-EPI 2021: >90 ML/MIN/1.73M*2
EOSINOPHIL # BLD AUTO: 0.19 X10*3/UL (ref 0–0.7)
EOSINOPHIL NFR BLD AUTO: 1.8 %
ERYTHROCYTE [DISTWIDTH] IN BLOOD BY AUTOMATED COUNT: 11 % (ref 11.5–14.5)
FLUAV RNA RESP QL NAA+PROBE: NOT DETECTED
FLUBV RNA RESP QL NAA+PROBE: NOT DETECTED
GLUCOSE SERPL-MCNC: 107 MG/DL (ref 74–99)
HCT VFR BLD AUTO: 42.1 % (ref 36–46)
HGB BLD-MCNC: 14.2 G/DL (ref 12–16)
IMM GRANULOCYTES # BLD AUTO: 0.05 X10*3/UL (ref 0–0.7)
IMM GRANULOCYTES NFR BLD AUTO: 0.5 % (ref 0–0.9)
LACTATE SERPL-SCNC: 2.3 MMOL/L (ref 0.4–2)
LYMPHOCYTES # BLD AUTO: 2.29 X10*3/UL (ref 1.2–4.8)
LYMPHOCYTES NFR BLD AUTO: 21.1 %
MAGNESIUM SERPL-MCNC: 1.89 MG/DL (ref 1.6–2.4)
MCH RBC QN AUTO: 31.8 PG (ref 26–34)
MCHC RBC AUTO-ENTMCNC: 33.7 G/DL (ref 32–36)
MCV RBC AUTO: 94 FL (ref 80–100)
MONOCYTES # BLD AUTO: 0.51 X10*3/UL (ref 0.1–1)
MONOCYTES NFR BLD AUTO: 4.7 %
NEUTROPHILS # BLD AUTO: 7.74 X10*3/UL (ref 1.2–7.7)
NEUTROPHILS NFR BLD AUTO: 71.4 %
NRBC BLD-RTO: 0 /100 WBCS (ref 0–0)
PLATELET # BLD AUTO: 236 X10*3/UL (ref 150–450)
POTASSIUM SERPL-SCNC: 3.7 MMOL/L (ref 3.5–5.3)
PROT SERPL-MCNC: 7.2 G/DL (ref 6.4–8.2)
RBC # BLD AUTO: 4.46 X10*6/UL (ref 4–5.2)
S PYO DNA THROAT QL NAA+PROBE: NOT DETECTED
SARS-COV-2 RNA RESP QL NAA+PROBE: NOT DETECTED
SODIUM SERPL-SCNC: 137 MMOL/L (ref 136–145)
WBC # BLD AUTO: 10.8 X10*3/UL (ref 4.4–11.3)

## 2024-11-15 PROCEDURE — 99284 EMERGENCY DEPT VISIT MOD MDM: CPT | Mod: 25

## 2024-11-15 PROCEDURE — 71045 X-RAY EXAM CHEST 1 VIEW: CPT | Performed by: RADIOLOGY

## 2024-11-15 PROCEDURE — 96365 THER/PROPH/DIAG IV INF INIT: CPT

## 2024-11-15 PROCEDURE — 87635 SARS-COV-2 COVID-19 AMP PRB: CPT | Performed by: PHYSICIAN ASSISTANT

## 2024-11-15 PROCEDURE — 70450 CT HEAD/BRAIN W/O DYE: CPT

## 2024-11-15 PROCEDURE — 83605 ASSAY OF LACTIC ACID: CPT | Performed by: PHYSICIAN ASSISTANT

## 2024-11-15 PROCEDURE — 83735 ASSAY OF MAGNESIUM: CPT | Performed by: PHYSICIAN ASSISTANT

## 2024-11-15 PROCEDURE — 87651 STREP A DNA AMP PROBE: CPT | Performed by: PHYSICIAN ASSISTANT

## 2024-11-15 PROCEDURE — 71045 X-RAY EXAM CHEST 1 VIEW: CPT

## 2024-11-15 PROCEDURE — 36415 COLL VENOUS BLD VENIPUNCTURE: CPT | Performed by: PHYSICIAN ASSISTANT

## 2024-11-15 PROCEDURE — 80053 COMPREHEN METABOLIC PANEL: CPT | Performed by: PHYSICIAN ASSISTANT

## 2024-11-15 PROCEDURE — 85025 COMPLETE CBC W/AUTO DIFF WBC: CPT | Performed by: PHYSICIAN ASSISTANT

## 2024-11-15 PROCEDURE — 70450 CT HEAD/BRAIN W/O DYE: CPT | Performed by: RADIOLOGY

## 2024-11-15 PROCEDURE — 2500000004 HC RX 250 GENERAL PHARMACY W/ HCPCS (ALT 636 FOR OP/ED): Performed by: PHYSICIAN ASSISTANT

## 2024-11-15 RX ORDER — LEVETIRACETAM 250 MG/1
250 TABLET ORAL 2 TIMES DAILY
Qty: 60 TABLET | Refills: 3 | Status: SHIPPED | OUTPATIENT
Start: 2024-11-15 | End: 2025-11-15

## 2024-11-15 RX ORDER — LEVETIRACETAM 5 MG/ML
500 INJECTION INTRAVASCULAR ONCE
Status: COMPLETED | OUTPATIENT
Start: 2024-11-15 | End: 2024-11-15

## 2024-11-15 RX ORDER — AZITHROMYCIN 250 MG/1
TABLET, FILM COATED ORAL
Qty: 6 TABLET | Refills: 0 | Status: SHIPPED | OUTPATIENT
Start: 2024-11-15 | End: 2024-11-20

## 2024-11-15 ASSESSMENT — PAIN - FUNCTIONAL ASSESSMENT: PAIN_FUNCTIONAL_ASSESSMENT: UNABLE TO SELF-REPORT

## 2024-11-15 NOTE — TELEPHONE ENCOUNTER
----- Message from Toya CEE sent at 11/15/2024  9:14 AM EST -----  Mom called stated patient had an seizure this morning, Per last visit she was titrating her programming up however since she is jumpy she has turned her back to her original settings would like advice please

## 2024-11-15 NOTE — ED PROVIDER NOTES
HPI   Chief Complaint   Patient presents with    Seizures     Pt had a seizure around 0500, EMS was called to the house, when they arrived, the patient was no longer seizing. Vital signs were obtained and pt was stable. Pt has a history of a movement disorder and her neurologist was contacted who recommended she come to the emergency room. Pt does not take currently take antiepileptics, but neurologist ordered keppra today. Pt is also having a cough and congestion and mother would like a chest xray.        Is a 28-year-old female coming in for seizure that occurred last night.  This is patient's second seizure.  Family is with her and states that patient had a seizure that occurred last night.  They contacted the neurologist who placed her on Keppra 250 mg twice a day but advised her to come the emergency room to rule out other causes.  Patient does have neuromuscular disease.  She did have a cough for the last 3 weeks but denies any other acute symptoms.  They did not give the Keppra yet as they wanted the patient to come in to be evaluated first.  Because of the patient's neuromuscular disease patient did have stiffening but was having more drooling as the symptoms and less responsiveness.  It lasted for approximately 5 minutes and then she improved afterwards.      History provided by:  Patient          Patient History   Past Medical History:   Diagnosis Date    ADHD     Anxiety     Cerebellar degeneration (CMS-HCC)     Cognitive decline     Dystonia     Nonverbal     Panic attacks     Patient underweight     Seizure (Multi) 2021    grand mal    Systolic hypertension in child      Past Surgical History:   Procedure Laterality Date    CT HEAD WO IV CONTRAST  08/11/2023    Under anesthesia    MR BRAIN W AND WO CONTRAST  08/11/2023    Under anesthesia    NO PAST SURGERIES       Family History   Problem Relation Name Age of Onset    No Known Problems Mother      Hyperlipidemia Father      Hyperlipidemia Sister        Social History     Tobacco Use    Smoking status: Never    Smokeless tobacco: Never   Vaping Use    Vaping status: Never Used   Substance Use Topics    Alcohol use: Never    Drug use: Never       Physical Exam   ED Triage Vitals   Temperature Heart Rate Respirations BP   11/15/24 1110 11/15/24 1110 11/15/24 1110 11/15/24 1110   36.7 °C (98.1 °F) 96 20 108/72      Pulse Ox Temp Source Heart Rate Source Patient Position   11/15/24 1110 11/15/24 1328 -- --   98 % Temporal        BP Location FiO2 (%)     -- --             Physical Exam  Vitals and nursing note reviewed.   Constitutional:       General: She is not in acute distress.     Appearance: She is not toxic-appearing.   HENT:      Head: Normocephalic and atraumatic.      Nose: Nose normal.      Mouth/Throat:      Mouth: Mucous membranes are moist.      Pharynx: Oropharynx is clear.   Eyes:      Extraocular Movements: Extraocular movements intact.      Conjunctiva/sclera: Conjunctivae normal.      Pupils: Pupils are equal, round, and reactive to light.   Cardiovascular:      Rate and Rhythm: Regular rhythm.      Pulses: Normal pulses.      Heart sounds: Normal heart sounds.   Pulmonary:      Effort: Pulmonary effort is normal. No respiratory distress.      Breath sounds: Normal breath sounds.   Abdominal:      General: Abdomen is flat. Bowel sounds are normal.      Palpations: Abdomen is soft.   Musculoskeletal:         General: Normal range of motion.      Cervical back: Normal range of motion and neck supple.   Skin:     General: Skin is warm and dry.      Coloration: Skin is not jaundiced or pale.      Findings: No bruising.   Neurological:      Mental Status: She is alert. Mental status is at baseline.      Comments: No seizure-like activity however patient does have contracture and stiffness of the extremities   Psychiatric:         Mood and Affect: Mood normal.         Behavior: Behavior normal.           ED Course & MDM   Diagnoses as of 11/15/24 1315    Seizure (Multi)   Acute cough                 No data recorded     Erlinda Coma Scale Score: 9 (11/15/24 1210 : Shell Jones RN)                           Medical Decision Making  Summary:  Medical Decision Making:   Patient presented as described in HPI. Patient case including ROS, PE, and treatment and plan discussed with ED attending if attached as cosigner. Results from labs and or imaging included below if completed. Eloise Costa  is a 28 y.o. coming in for Patient presents with:  Seizures: Pt had a seizure around 0500, EMS was called to the house, when they arrived, the patient was no longer seizing. Vital signs were obtained and pt was stable. Pt has a history of a movement disorder and her neurologist was contacted who recommended she come to the emergency room. Pt does not take currently take antiepileptics, but neurologist ordered keppra today. Pt is also having a cough and congestion and mother would like a chest xray.   .  Patient had a witnessed seizure last night.  Advised to come in by neurology for further assessment and treatment.  CT imaging will be completed of the head as well as a chest x-ray.  Patient has been exposed to walk pneumonia and has had multiple close contacts with walking pneumonia.  Lab work will also be completed and patient will be given 500 mg IV Keppra due to the loading dose.  Lab work showed no acute concerning abnormalities.  CT scan of the head is negative.  Chest x-ray is unremarkable.  Patient will be started on azithromycin and discharged.  Family states that they already have the prescription for Keppra.  Will follow-up closely with neurology.  Prescription was sent to patient's pharmacy and advised on follow-up with neurology as well as PCP.      Disposition is completed with shared decision making with the patient or guardian present with the patient. They were advised to follow up with PCP or recommended provider in 2-3 days for another evaluation and exam. I  advised the patient to return or go to closest emergency room immediately if symptoms change, get worse, or new symptoms develop prior to follow up. I explained the plan and treatment course. Patient/guardian is in agreement with plan, treatment course, and follow up and state that they will comply.    Labs Reviewed  CBC WITH AUTO DIFFERENTIAL - Abnormal     WBC                           10.8                   nRBC                          0.0                    RBC                           4.46                   Hemoglobin                    14.2                   Hematocrit                    42.1                   MCV                           94                     MCH                           31.8                   MCHC                          33.7                   RDW                           11.0 (*)               Platelets                     236                    Neutrophils %                 71.4                   Immature Granulocytes %, Automated   0.5                    Lymphocytes %                 21.1                   Monocytes %                   4.7                    Eosinophils %                 1.8                    Basophils %                   0.5                    Neutrophils Absolute          7.74 (*)               Immature Granulocytes Absolute, Au*   0.05                   Lymphocytes Absolute          2.29                   Monocytes Absolute            0.51                   Eosinophils Absolute          0.19                   Basophils Absolute            0.05                COMPREHENSIVE METABOLIC PANEL - Abnormal     Glucose                       107 (*)                Sodium                        137                    Potassium                     3.7                    Chloride                      102                    Bicarbonate                   25                     Anion Gap                     14                     Urea Nitrogen                 13                      Creatinine                    0.57                   eGFR                          >90                    Calcium                       9.0                    Albumin                       4.3                    Alkaline Phosphatase          85                     Total Protein                 7.2                    AST                           24                     Bilirubin, Total              0.5                    ALT                           8                   LACTATE - Abnormal     Lactate                       2.3 (*)                  Narrative: Venipuncture immediately after or during the administration of Metamizole may lead to falsely low results. Testing should be performed immediately prior to Metamizole dosing.  GROUP A STREPTOCOCCUS, PCR - Normal     Group A Strep PCR                                 MAGNESIUM - Normal     Magnesium                     1.89                SARS-COV-2 PCR - Normal     Coronavirus 2019, PCR                                  Narrative: This assay has received FDA Emergency Use Authorization (EUA) and is only authorized for the duration of time that circumstances exist to justify the authorization of the emergency use of in vitro diagnostic tests for the detection of SARS-CoV-2 virus and/or diagnosis of COVID-19 infection under section 564(b)(1) of the Act, 21 U.S.C. 360bbb-3(b)(1). This assay is an in vitro diagnostic nucleic acid amplification test for the qualitative detection of SARS-CoV-2 from nasopharyngeal specimens and has been validated for use at [facility]. Negative results do not preclude COVID-19 infections and should not be used as the sole basis for diagnosis, treatment, or other management decisions.                  INFLUENZA A AND B PCR - Normal     Flu A Result                                         Flu B Result                                           Narrative: This assay is an in vitro diagnostic multiplex nucleic acid  amplification test for the detection and discrimination of Influenza A & B from nasopharyngeal specimens, and has been validated for use at [facility]. Negative results do not preclude Influenza A/B infections, and should not be used as the sole basis for diagnosis, treatment, or other management decisions. If Influenza A/B and RSV PCR results are negative, testing for Parainfluenza virus, Adenovirus and Metapneumovirus is routinely performed for [facility] [place] [place] inpatients, and is available on other patients by placing an add-on request.  LACTATE   XR chest 1 view   Final Result    1.  No evidence of acute cardiopulmonary process.                      MACRO:    None          Signed by: Evelina Mitchell 11/15/2024 2:25 PM    Dictation workstation:   [id number]     CT head wo IV contrast   Final Result    No evidence of acute cortical infarct or intracranial hemorrhage.          Chronic changes as described. Changes of diffuse volume loss.          MACRO:    None          Signed by: Evelina Mitchell 11/15/2024 1:44 PM    Dictation workstation:   [id number]                            Tests/Medications/Escalations of Care considered but not given: As in MDM    Patient care discussed with: N/A  Social Determinants affecting care: N/A    Final diagnosis and disposition as documented     Diagnoses as of 11/15/24 1453  Seizure (Multi)  Acute cough       Shared decision making was completed and determined that patient will be discharged. I discussed the differential; results and discharge plan with the patient and/or family/friend/caregiver if present.  I emphasized the importance of follow-up with the physician I referred them to in the timeframe recommended.  I explained reasons for the patient to return to the Emergency Department. They agreed that if they feel their condition is worsening or if they have any other concern they should call 911 immediately for further assistance. I  gave the patient an opportunity to ask all questions they had and answered all of them accordingly. They understand return precautions and discharge instructions. The patient and/or family/friend/caregiver expressed understanding verbally and that they would comply.     Disposition: Discharge      This note has been transcribed using voice recognition and may contain grammatical errors, misplaced words, incorrect words, incorrect phrases or other errors.         Procedure  Procedures     Ji Kay PA-C  11/15/24 3863

## 2024-11-15 NOTE — TELEPHONE ENCOUNTER
She had a seizure 3 yrs ago--none since.     She had one at 5 am today, the same way as before. Occurred when she was waking up, still in bed. She heard some noises on the monitor, eyes were rolled back, having trouble breathing, no shaking, rolled her to her side, waited maybe 1min and called 911 and when they answered she was coming out of it, they came and checked her vitals and was OK, declined hospital as they did not do anything but run tests and were not sure if it was a seizure.   Water coming out of her mouth like spit, not foaming like 3yrs ago. Today she is supposed to turn DBS up which she is afraid to do.   She gets a jolt and jumps every once in a while. So she placed her back to her original setting. She was on program 4.   She has had a cough x 2 weeks and nose stuffy and no OTC meds.     Asking for PRN med and if anything other than baby monitor to monitor for seizure so she can hear her.   OTC meds she can take when not feeling well and cough is mostly is mostly at night.     She continues on same dose lorazepam without missed doses, nightly before bed every night.       Plan of care d/w Dr. Navarro who rec  -Keppra 250mg BID--discussed most common SE including sedation, behavior changes and to let us know of any  -ED for brain imaging and quicker workup, [facility]a or [facility]   -Baby monitor is good to monitor her  -Epilepsy consult       I let her know OK for dextromethorphan for cough, but should let ER know to check CXR to be sure no pna.

## 2024-11-15 NOTE — ED TRIAGE NOTES
Pt had a seizure around 0500, EMS was called to the house, when they arrived, the patient was no longer seizing. Vital signs were obtained and pt was stable. Pt has a history of a movement disorder and her neurologist was contacted who recommended she come to the emergency room. Pt does not take currently take antiepileptics, but neurologist ordered keppra today. Pt is also having a cough and congestion and mother would like a chest xray.

## 2024-12-19 NOTE — PROGRESS NOTES
"Subjective     Eloise Costa is a 28 y.o. year old female who presents with dystonia, here for follow up visit.    A video visit between the patient (at the originating site/her home) and the provider (at the distant site) was utilized to provide this telehealth service.     HPI  Presents virtually for DBS programming.   Last visit with me10/9/24:  Baseline is the program you came in on today is program 3--you can always go back if side effects on any other programs  But lets stay on program 3 x 1 week (4 weeks total). If no changes, go to program 4 (higher settings) x 4 weeks, if not improving but no SE, go to program 1 (higher settings than program 4)    11/15 she had a seizure witnessed by her mother.   Plan of care d/w Dr. Navarro who rec  -Keppra 250mg BID--discussed most common SE including sedation, behavior changes and to let us know of any  -ED for brain imaging and quicker workup, [facility] or [facility]   -Baby monitor is good to monitor her  -Epilepsy consult     She went to ED \"given 500 mg IV Keppra due to the loading dose. Lab work showed no acute concerning abnormalities. CT scan of the head is negative. Chest x-ray is unremarkable. Patient will be started on azithromycin and discharged. Family states that they already have the prescription for Keppra. Will follow-up closely with neurology. Prescription was sent to patient's pharmacy and advised on follow-up with neurology as well as PCP\"    She had been sick for 2.5 weeks at that point and coughing got better with antibiotic. CT head negative.  No further seizures. Pending apt with epileptologist.   Doing well on Keppra, usual smiley self.     She was on program 4 11/24 and then program 1 December 8th. Dystonia is looser when mother goes to pull her, does not move her arms on her own. Generally wants to keep her arms.     Still thinks SInemet aggravates her and cries 30mins after she takes it.         Meds  Sinemet 25/100mg 0.5tabs 2 imes a day (higher " doses than 0.5 QID=anxious, crying and no benefit)  Baclofen 10mg 1.5tabs 3 times a day  Lorazepam 1mg 0.5 tab 2 times a day      Prior imaging:  CT head WO 11/15/24:  IMPRESSION:  No evidence of acute cortical infarct or intracranial hemorrhage.    MRI brain 8/11/23:  IMPRESSION:  Volumetric CT performed for surgical planning purposes with findings  as described above.      Past hx:  Genetic testing was done, and upon the last chart update by Dr. 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 She has generalized dystonia which has progressively worsened since she was diagnosed in 2016 (parents report she was normal prior to this), has not walked without assist since 2017. She is completely dependent for ADLs. Exam is very limited today due to being non-verbal, inability to follow commands, limited movement.                      Current Outpatient Medications:     baclofen (Lioresal) 10 mg tablet, Take 1.5 tablets (15 mg) by mouth 3 times a day., Disp: 540 tablet, Rfl: 1    carbidopa-levodopa (Sinemet)  mg tablet, Take 0.5 tablets by mouth 3 times a day., Disp: 135 tablet, Rfl: 3    docusate sodium (Colace) 100 mg capsule, Take 1 capsule (100 mg) by mouth 2 times a day., Disp: , Rfl:     levETIRAcetam (Keppra) 250 mg tablet, Take 1 tablet (250 mg) by mouth 2 times a day., Disp: 60 tablet, Rfl: 3    LORazepam (Ativan) 1 mg tablet, Take 1/2 (one-half) tablet by mouth twice daily, Disp: 90 tablet, Rfl: 0       Objective   There were no vitals filed for this visit.              Physical Exam  R leaning  Dystonia in BUE and BLE- has some passive ROM but not much  R wrist flexion>L wrist flexion with fingers curled  Non verbal, sitting up in chair  Awake, alert    DBS Procedure Visit  [facility] 2/22/24  IPG 2.94--unchanged  TI: L-500  R-562  SI: all OK    Initial Settings  Program 1   L GPi  C+2-3- 2.75/100/90 ((0-3.0))  R GPi  C+10-11- 2.75/100/90  ((0-3.0))      Programming  Slowly increased settings watching for change/mother monitoring    Seems a little loose on program 5, same as initial program 1--higher PW and higher amp, no SE--waited about 12mins on these settings    PLAN for future visits  Double monopolar with more dorsal contacts ((3-, 4- and 11-,12-)  Could try segments though ring usually used for dystonia      Final Settings  Program 2 ((recovery program same as program 1))  L GPi  C+2-3- 2.75/100/90 ((0-3.0))  R GPi  C+10-11- 2.75/100/90 ((0-3.0))    Program 5---new program  L GPi  C+2-3- 2.85/120/90 ((0-3.0))  R GPi  C+10-11- 2.85/120/90 ((0-3.0))    Program 3 ((now baseline))  L GPi  C+2- 3.5/130/90 ((0-3.5))  R GPi  C+10- 3.5/130/90 ((0-3.5))    Program 4  L GPi  C+2-3- 2.5/90/80/90 ((0-3.0))  R GPi  C+10-11- 2.5/80/90 ((0-3.0))          --------------------Previous Programming------------------------  4/3/24  Initial monopolar review  Watching for SE/subtle changes, she has dystonic tremor in RLE and BUE when trying to move it, parents move extremities throughout review to see if easier since dressing/hygiene are issues for her    C+ /Freq 60 tried but charge density limit reached at 2.5 (no SE) so did traditional MPR with PW 60 and freq at 130,      L GPi   60/130  1- 0.5--RUE slightly looser, at 2.0 she is moving RLE, 2.5 moving RUE some   2- arm goes back to midline until 2.0--looser, 3.5 her RLE straightens more  3-3.0 tighter compared to 1&2 but having more movement in RUE and RLE  4-same, benefit throughout w/ easier lifting but still difficult to move compared to 1-2    W/ DBS off RUE becomes adducted again against her chest again but had improvement throughout above    R GPi  60/130  9- 1.5 improved ROM LUE and continued to improve with higher setting 4.0 and her arm is no longer adducted to chest.   10- 2.0 she can lift her LUE much easier 3.5 SE neck dystonia/anterocollis and seemed different   11- 1.0 she has better ROM in  LUE and 4.0 good ROM to LUE  12- 2.0 better ROM LUE but still difficult      Final settings:  Program 1: ACTIVE   L GPi  C+1- 2.0/60/130 ((0-2.0))  R GPi  C+9- 2.0/60/130 ((0-2.0))      Program 2:  L GPi  C+2- 2.5/60/130  R GPi  C+10- 2.5/60/130 ((0-2.5))    5/1/24  Initial Settings  Program 1: ACTIVE   L GPi  C+1- 2.0/60/130 ((0-2.0))  R GPi  C+9- 2.0/60/130 ((0-2.0))     Programming  Copied 1 to 3 and increased settings to amp 2.5 and PW 90-ROM in arms is better but severe neck pulling. Turned off and improved and back on and re-occurred.      Program 2 tried without SE---copied this to program 3 and increased amp to 3.0 and PW to 80 on both sides---SE R facial twitching and neck pulling. Lowered amp in L GPi to 2.5 and PW 80 bilaterally and no SE---again, ROM is better per her mother. Then neck pulls again down to the R---lowered freq to 90 both sides and was able to increase R GPi amp back to 3.0     However, mother also notes that regardless of DBS mouth sometimes twitches AND her head pulls R but these seemed more severe.  Also, whenever she speaks her arms and legs cross and she straightens when she is more excited.         Program 1- most ventral contact--side effect with higher amp and PW so kept settings unchanged  Program 2- 2nd most ventral contact with some further improvement today  New program 3--higher settings than program 2 well tolerated      Final Settings  Program 2  C+2- 2.5/60/130  R GPi  C+10- 2.5/60/130 ((0-2.5))     Program 3:  C+2- 3.0/70/90 ((1.5-3.0))  R GPi  C+10- 3.0/70/90 ((1.5-3.0))    6/19/24  Initial Settings  Program 3:  L GPi  C+2- 3.0/70/90 ((1.5-3.0))  R GPi  C+10- 3.0/70/90 ((1.5-3.0))    Deleted old program 1&2    Increased PW and amp to 3.5 and 80 and developed head pulling forward and L facial twitching??---leans R but not usually with her head forward and her mom can straighten her---turned off and back on and no SE and LUE moves better as well as RUE    Mother notes her  lips do sometimes twitch so not really a side effect  She was off x 5 mins and had an episode of leaning forward without stimulation so unsure that is a side effect but head not usually in that position.     New program 1 is program 3 with higher settings  New program 2 is program 1 with higher settings    New program 4 created incase above is not tolerated---she felt looser to her mother  L GPi  C+2-3- 2.5/90/80/90  R GPi  C+10-11- 2.5/80/90    Final Settings  Program 1: active  L GPi  C+2- 3.5/80/90 ((0-3.0))  R GPi  C+10- 3.5/80/90 ((0-3.0))    Program 2:  L GPi  C+2- 3.5/90/90 ((0-3.5))  R GPi  C+10- 3.5/90/90 ((0-3.5))    Program 4  L GPi  C+2-3- 2.5/90/80/90 ((0-3.0))  R GPi  C+10-11- 2.5/80/90 ((0-3.0))    Program 3 (baseline)  L GPi  C+2- 3.0/70/90 ((1.5-3.0))  R GPi  C+10- 3.0/70/90 ((1.5-3.0))    8/14/24  Initial Settings  Program 2:  L GPi  C+2- 3.5/90/90 ((0-3.5))  R GPi  C+10- 3.5/90/90 ((0-3.5))        Programming  Deleted program 1&2 as were lower settings than current program 2     Made new program 1 which is higher settings (increased PW) and program 3 which is higher settings than 1 (higher PW)---she tolerated program 3 for 10 mins without SE        PLAN for future visits  Double monopolar with more dorsal contacts  Could try segments though ring usually used for dystonia        Final Settings  Program 1- ACTIVE  L GPi  C+2- 3.5/110/90 ((0-3.5))  R GPi  C+10- 3.5/110/90 ((0-3.5))     Program 2 ((now is her baseline))  L GPi  C+2- 3.5/90/90 ((0-3.5))  R GPi  C+10- 3.5/90/90 ((0-3.5))     Program 3  L GPi  C+2- 3.5/130/90 ((0-3.5))  R GPi  C+10- 3.5/130/90 ((0-3.5))     Program 4  L GPi  C+2-3- 2.5/90/80/90 ((0-3.0))  R GPi  C+10-11- 2.5/80/90 ((0-3.0))        Assessment/Plan   Ms. Eloise Costa is a 28 y.o. female with generalized dystonia who presents for initial DBS visit for dystonia. She is s/p b/l GPi DBS and L chest generator placement w/ [facility] on 2/22/24. She has had mild improvement, her  mother can now wash under her arms easier and patient is chewing better. Will continue to adjust stimulation as tolerated.   Though patient has dyskinesias with higher dose Sinemet, reported response to medication making caring for her easier but this response has waned over time and we have decreased dose without worsening dystonia, she also has side effect and so will wean off.    Seizure as noted in HPI, went to ED, had loading dose Keppra and continues on oral. Could have been in the setting of URI which she was also treated for. Has a history of seizure years before once. She will see epileptologist next month.      Future plans include OT once able and wheelchair evaluation.       Diagnoses and all orders for this visit:  Generalized dystonia  Seizure (Multi)      #Decrease Carbidopa-levodopa 25/100mg to 0.5 tab daily x 1 week, then stop.     #If dystonia improves much more, let me know and we can do occupational therapy consult in the home    Also wheelchair eval planned for the future    #DBS adjusted today      Baseline is the program you came in on today is program 2 ((same as program 1))--you can always go back if side effects on any other programs    But lets stay on program until Jan 19th. If no changes, go to program 5 (higher settings     #Continue other medications unchanged  Baclofen 10mg 1.5tabs 3 times a day  Lorazepam 1mg 0.5 tab 2 times a day    #Follow up March 7th at 8am with me--virtually for DBS visit      Total time of 33 minutes for today's visit, of which 10 mins were spent with DBS programming as noted above-checking settings, stimulator events, energy level, and impedances and updating settings in the chart.    Mehdi Busby, KEELY-C  Adult/Gerontological Nurse Practitioner   [facility], Department of Neurology  [facility] [place]  [facility]  [address] [place] [address]  Phone: [phone]  Fax: [phone]

## 2024-12-20 ENCOUNTER — APPOINTMENT (OUTPATIENT)
Dept: NEUROLOGY | Facility: CLINIC | Age: 29
End: 2024-12-20
Payer: MEDICAID

## 2024-12-20 DIAGNOSIS — R56.9 SEIZURE (MULTI): ICD-10-CM

## 2024-12-20 DIAGNOSIS — G24.9 GENERALIZED DYSTONIA: Primary | ICD-10-CM

## 2024-12-20 PROCEDURE — 1036F TOBACCO NON-USER: CPT | Performed by: NURSE PRACTITIONER

## 2024-12-20 PROCEDURE — 99213 OFFICE O/P EST LOW 20 MIN: CPT | Performed by: NURSE PRACTITIONER

## 2024-12-20 PROCEDURE — 95983 ALYS BRN NPGT PRGRMG 15 MIN: CPT | Performed by: NURSE PRACTITIONER

## 2024-12-20 NOTE — PATIENT INSTRUCTIONS
#Decrease Carbidopa-levodopa 25/100mg to 0.5 tab daily x 1 week, then stop.     #If dystonia improves much more, let me know and we can do occupational therapy consult in the home    Also wheelchair eval planned for the future    #DBS adjusted today      Baseline is the program you came in on today is program 2 ((same as program 1))--you can always go back if side effects on any other programs    But lets stay on program until Jan 19th. If no changes, go to program 5 (higher settings     #Continue other medications unchanged  Baclofen 10mg 1.5tabs 3 times a day  Lorazepam 1mg 0.5 tab 2 times a day    #Follow up March 7th at 8am with me--virtually for DBS visit    Mehdi Busby, KEELY-C  Adult/Gerontological Nurse Practitioner   [facility], Department of Neurology  [facility] [place]  [facility]  [address] [place] [address]  Phone: [phone]  Fax: [phone]

## 2025-01-02 ENCOUNTER — TELEMEDICINE (OUTPATIENT)
Dept: PRIMARY CARE | Facility: CLINIC | Age: 30
End: 2025-01-02
Payer: MEDICAID

## 2025-01-02 DIAGNOSIS — J06.9 UPPER RESPIRATORY TRACT INFECTION, UNSPECIFIED TYPE: Primary | ICD-10-CM

## 2025-01-02 PROCEDURE — 99213 OFFICE O/P EST LOW 20 MIN: CPT

## 2025-01-02 RX ORDER — AMOXICILLIN AND CLAVULANATE POTASSIUM 400; 57 MG/5ML; MG/5ML
875 POWDER, FOR SUSPENSION ORAL 2 TIMES DAILY
Qty: 218 ML | Refills: 0 | Status: SHIPPED | OUTPATIENT
Start: 2025-01-02 | End: 2025-01-12

## 2025-01-02 ASSESSMENT — ENCOUNTER SYMPTOMS
COUGH: 1
FEVER: 1
SORE THROAT: 1
HEADACHES: 1

## 2025-01-02 NOTE — PROGRESS NOTES
Chief Complaint Eloise Costa is a 29 y.o. female who presents via video telehealth for URI symptoms    HPI:  Pt presented via video with her mother who was the main historian for this visit. Mother stated that the patient has a neurodegenerative disorder and approximately 4 days ago she started developing a fever, congestion and cough. Measured temperature yesterday and it was 103.3 and today it was 101. Patient also has been having some gagging and vomiting due to congestion. Patient has been acting normal and not confused or lethargic. However mother is concerned because approximately a month or so ago patient go sick and ended up having a seizure and was told by the neurologist that if she gets sick to be seen early and treat early.        ROS:  Review of Systems   Constitutional:  Positive for fever.   HENT:  Positive for sore throat.    Respiratory:  Positive for cough.    Neurological:  Positive for headaches.       Meds:    Current Outpatient Medications:     amoxicillin-pot clavulanate (Augmentin) 400-57 mg/5 mL suspension, Take 10.9 mL (875 mg) by mouth 2 times a day for 10 days., Disp: 218 mL, Rfl: 0    baclofen (Lioresal) 10 mg tablet, Take 1.5 tablets (15 mg) by mouth 3 times a day., Disp: 540 tablet, Rfl: 1    carbidopa-levodopa (Sinemet)  mg tablet, Take 0.5 tablets by mouth 3 times a day., Disp: 135 tablet, Rfl: 3    cefdinir (Omnicef) 250 mg/5 mL suspension, Take 6 mL (300 mg) by mouth 2 times a day for 10 days., Disp: 120 mL, Rfl: 0    docusate sodium (Colace) 100 mg capsule, Take 1 capsule (100 mg) by mouth 2 times a day., Disp: , Rfl:     levETIRAcetam (Keppra) 250 mg tablet, Take 1 tablet (250 mg) by mouth 2 times a day., Disp: 60 tablet, Rfl: 3    LORazepam (Ativan) 1 mg tablet, Take 1/2 (one-half) tablet by mouth twice daily, Disp: 90 tablet, Rfl: 0    Allergies:  No Known Allergies    PE:  Visit Vitals  Smoking Status Never     Physical Exam  Constitutional:       General: She is  awake.      Appearance: She is well-developed. She is ill-appearing.   HENT:      Mouth/Throat:      Mouth: Mucous membranes are moist.      Pharynx: Oropharynx is clear.   Neurological:      Mental Status: Mental status is at baseline.      Motor: Weakness and atrophy present.   Psychiatric:         Attention and Perception: She is inattentive.         Mood and Affect: Affect is flat.         Speech: She is noncommunicative.         Behavior: Behavior is slowed.         Cognition and Memory: Cognition is impaired.         Judgment: Judgment is inappropriate.           Assessment/Plan  Eloise Costa is a 29 y.o. female who presents via video telehealth for viral URI symptoms. Due to neurodegenerative disorder patient is at high risk of developing complications for URI. Patient was started on augmentin bid x 10 days. Recommended to continue conservative treatment such a tylenol, ibuprofen, cough medicine and to continue to monitor for any worsening of condition. Discussed about precautions of when to go to the ED or when to call the office. Mother agreed with treatment and plan.    Diagnoses and all orders for this visit:  Upper respiratory tract infection, unspecified type (Primary)  -     amoxicillin-pot clavulanate (Augmentin) 400-57 mg/5 mL suspension; Take 10.9 mL (875 mg) by mouth 2 times a day for 10 days.         Follow up in a few days if not improved on medication      Patient was staffed with Dr. Hong Sheppard DO, PGY-3  [facility]  Answers submitted by the patient for this visit:  Cough Questionnaire (Submitted on 1/2/2025)  Chief Complaint: Cough  Chronicity: new  Onset: in the past 7 days  Progression since onset: rapidly worsening  Frequency: every few minutes  Cough characteristics: non-productive  nasal congestion: Yes

## 2025-01-03 ENCOUNTER — PATIENT MESSAGE (OUTPATIENT)
Dept: NEUROLOGY | Facility: CLINIC | Age: 30
End: 2025-01-03
Payer: MEDICAID

## 2025-01-03 ENCOUNTER — TELEPHONE (OUTPATIENT)
Dept: PRIMARY CARE | Facility: CLINIC | Age: 30
End: 2025-01-03
Payer: MEDICAID

## 2025-01-03 DIAGNOSIS — J06.9 UPPER RESPIRATORY TRACT INFECTION, UNSPECIFIED TYPE: Primary | ICD-10-CM

## 2025-01-03 RX ORDER — CEFDINIR 250 MG/5ML
14 POWDER, FOR SUSPENSION ORAL 2 TIMES DAILY
Qty: 120 ML | Refills: 0 | Status: SHIPPED | OUTPATIENT
Start: 2025-01-03 | End: 2025-01-13

## 2025-01-03 NOTE — TELEPHONE ENCOUNTER
Started Augmentin last night.  Woke up this morning with diarrhea.  Mom wasn't sure if she should change due to patient can't clean up herself.

## 2025-01-04 NOTE — PROGRESS NOTES
I reviewed and examined the patient. I was present for the key exam elements, and I fully participated in the patient's care. I discussed the management of the care with the resident. I have personally reviewed the pertinent labs and imaging, as well as recent notes, with the patient. I have reviewed the note above and agree with the resident's medical decision making as documented in the resident's note, in addition to the following comments / findings:     Agree with the rest of the plan outlined below by resident physician. No red flags.      The patient understands and agrees to the assessment and plan of care. Patient has also agreed to follow up and comply with the treatment and evaluation as recommended today. Patient was instructed to call the office at [phone] should questions arise regarding their treatment or care.     Apollo Pitts DO, FAOASM  Family Medicine   [address], [address]  [place] [address]     Apollo Pitts DO

## 2025-01-06 ENCOUNTER — TELEPHONE (OUTPATIENT)
Dept: PRIMARY CARE | Facility: CLINIC | Age: 30
End: 2025-01-06
Payer: MEDICAID

## 2025-01-06 NOTE — TELEPHONE ENCOUNTER
Patients mom called said they never took her off of the Augmentin the diarrhea got better but now her urine looks like there is some blood.  Has a fever this morning 99.8-100.  Mom said she has been on Cephalexin 500mg.  Can she get that and be on both or just go on the Cephalexin.

## 2025-01-29 ENCOUNTER — APPOINTMENT (OUTPATIENT)
Dept: NEUROLOGY | Facility: CLINIC | Age: 30
End: 2025-01-29
Payer: MEDICAID

## 2025-01-29 DIAGNOSIS — G24.9 GENERALIZED DYSTONIA: ICD-10-CM

## 2025-01-29 DIAGNOSIS — F41.9 ANXIETY: ICD-10-CM

## 2025-01-29 RX ORDER — LORAZEPAM 1 MG/1
TABLET ORAL 2 TIMES DAILY
Qty: 90 TABLET | Refills: 0 | Status: SHIPPED | OUTPATIENT
Start: 2025-01-29

## 2025-02-11 ENCOUNTER — OFFICE VISIT (OUTPATIENT)
Dept: NEUROLOGY | Facility: CLINIC | Age: 30
End: 2025-02-11
Payer: MEDICAID

## 2025-02-11 VITALS
BODY MASS INDEX: 16.82 KG/M2 | WEIGHT: 98 LBS | DIASTOLIC BLOOD PRESSURE: 66 MMHG | SYSTOLIC BLOOD PRESSURE: 102 MMHG | RESPIRATION RATE: 18 BRPM | HEART RATE: 78 BPM

## 2025-02-11 DIAGNOSIS — R56.9 SEIZURE (MULTI): ICD-10-CM

## 2025-02-11 PROCEDURE — 99215 OFFICE O/P EST HI 40 MIN: CPT | Performed by: PSYCHIATRY & NEUROLOGY

## 2025-02-11 PROCEDURE — 3074F SYST BP LT 130 MM HG: CPT | Performed by: PSYCHIATRY & NEUROLOGY

## 2025-02-11 PROCEDURE — 1036F TOBACCO NON-USER: CPT | Performed by: PSYCHIATRY & NEUROLOGY

## 2025-02-11 PROCEDURE — 3078F DIAST BP <80 MM HG: CPT | Performed by: PSYCHIATRY & NEUROLOGY

## 2025-02-11 RX ORDER — MIDAZOLAM 5 MG/.1ML
SPRAY NASAL
Qty: 2 EACH | Refills: 2 | Status: SHIPPED | OUTPATIENT
Start: 2025-02-11

## 2025-02-11 RX ORDER — LEVETIRACETAM 250 MG/1
250 TABLET ORAL 2 TIMES DAILY
Qty: 60 TABLET | Refills: 11 | Status: SHIPPED | OUTPATIENT
Start: 2025-02-11 | End: 2026-02-11

## 2025-02-11 ASSESSMENT — PAIN SCALES - GENERAL: PAINLEVEL_OUTOF10: 0-NO PAIN

## 2025-02-11 NOTE — PROGRESS NOTES
Ms. Costa is a 28 y.o. 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   Today she presents with her mother and father.  Most recent event concerning for a seizure was November 2024. This was the patient's second event, the first seizure was 3 years ago. Both events in the setting of sleep deprivation.    Sz semiology per father and mother:  . Nov event: episode was ta 5AM. Patient uses a baby monitor. Mother was alerted by some noises, when she walked into the patient's room patient was laying on bed, eyes were closed and she was grasping for air. She was foaming at the mouth, no blood. By the time EMS came, she was back to herself.    . Patient had a previous event 3 years prior ~2021. ~9AM mom woke up the patient, mom walked patient to the bathroom, the she noticed eyes rolled back and patient became stiff less than 1 minute, then went limp. Back to baseline after 1 minute.     Had 2 days monitoring with EEG ~6 years ago. No recent EEG, DVS truned on.     During last seizures she was taken to OSH levetiracetam was started at 250mg bid. Since then she has been ding well.     Plan:  Continue levetiracetalm 250mg bid  LEV level  Nayzilam for rescue medication to prevent status epilepticus. Use one spray in one nostril for a convulsive seizure lasting longer than 3 minutes, may use the second spray in other nostril is seizure continuous for 10 minutes.  PCP to consider event monitor to cardiology referral to rule out any cardiac arrhythmia.  Seizure precautions.  FU in 6 months

## 2025-02-11 NOTE — PATIENT INSTRUCTIONS
Eloise had 2 events suspected to be seizures based on initial description of the events.   Currently on  mg bid, doing well.  Convulsive syncope is part of the differential for her event.  Also has sleeping problems with insomnia.    Plan:  Continue levetiracetam 250mg bid  Levetiracetam level  Nayzilam for rescue medication to prevent status epilepticus. Use one spray in one nostril for a convulsive seizure lasting longer than 3 minutes, may use the second spray in other nostril is seizure continuous for 10 minutes.  PCP to consider event monitor to cardiology referral to rule out any cardiac arrhythmia.

## 2025-03-07 ENCOUNTER — APPOINTMENT (OUTPATIENT)
Dept: NEUROLOGY | Facility: CLINIC | Age: 30
End: 2025-03-07
Payer: MEDICAID

## 2025-03-07 DIAGNOSIS — G24.9 GENERALIZED DYSTONIA: Primary | ICD-10-CM

## 2025-03-07 PROCEDURE — 1036F TOBACCO NON-USER: CPT | Performed by: NURSE PRACTITIONER

## 2025-03-07 PROCEDURE — 95983 ALYS BRN NPGT PRGRMG 15 MIN: CPT | Performed by: NURSE PRACTITIONER

## 2025-03-07 NOTE — PROGRESS NOTES
Subjective     Eloise Costa is a 29 y.o. year old female who presents with No chief complaint on file., here for follow up visit.    A video visit between the patient (at the originating site/her home) and the provider (at the distant site) was utilized to provide this telehealth service.     HPI  Presents virtually for DBS programming.   Last visit with me  11/15 she had a seizure witnessed by her mother.   Plan of care d/w Dr. Navarro who rec  -Keppra 250mg BID--discussed most common SE including sedation, behavior changes and to let us know of any  -ED for brain imaging and quicker workup, [facility] or [facility]   -Baby monitor is good to monitor her  -Epilepsy consult     No changes since last programming.     No further sickness or seizures.     Back on Sinemet BID, she seems a little less movement, not a huge difference.     No illness.     She is sleeping OK, sometimes wakes in the night and has to settle her down but unsure why.     Dr. Ross 2/11/25:  Eloise had 2 events suspected to be seizures based on initial description of the events.   Currently on  mg bid, doing well.  Convulsive syncope is part of the differential for her event.  Also has sleeping problems with insomnia.  Plan:  Continue levetiracetam 250mg bid  Levetiracetam level  Nayzilam for rescue medication to prevent status epilepticus. Use one spray in one nostril for a convulsive seizure lasting longer than 3 minutes, may use the second spray in other nostril is seizure continuous for 10 minutes.  PCP to consider event monitor to cardiology referral to rule out any cardiac arrhythmia.        Meds  Sinemet 25/100mg 0.5tabs 2 imes a day (higher doses than 0.5 QID=anxious, crying and no benefit)  Baclofen 10mg 1.5tabs 3 times a day  Lorazepam 1mg 0.5 tab 2 times a day      Prior imaging:  CT head WO 11/15/24:  IMPRESSION:  No evidence of acute cortical infarct or intracranial hemorrhage.    MRI brain 8/11/23:  IMPRESSION:  Volumetric CT  performed for surgical planning purposes with findings  as described above.      Past hx:  Genetic testing was done, and upon the last chart update by Dr. 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 She has generalized dystonia which has progressively worsened since she was diagnosed in 2016 (parents report she was normal prior to this), has not walked without assist since 2017. She is completely dependent for ADLs. Exam is very limited today due to being non-verbal, inability to follow commands, limited movement.                      Current Outpatient Medications:     baclofen (Lioresal) 10 mg tablet, Take 1.5 tablets (15 mg) by mouth 3 times a day., Disp: 540 tablet, Rfl: 1    carbidopa-levodopa (Sinemet)  mg tablet, Take 0.5 tablets by mouth 3 times a day., Disp: 135 tablet, Rfl: 3    docusate sodium (Colace) 100 mg capsule, Take 1 capsule (100 mg) by mouth 2 times a day., Disp: , Rfl:     levETIRAcetam (Keppra) 250 mg tablet, Take 1 tablet (250 mg) by mouth 2 times a day., Disp: 60 tablet, Rfl: 11    LORazepam (Ativan) 1 mg tablet, Take 1/2 (one-half) tablet by mouth twice daily, Disp: 90 tablet, Rfl: 0    nasal spray midazolam (Nayzilam) 5 mg/spray (0.1 mL) spray,non-aerosol, Use one spray in one nostril for a convulsive seizure lasting longer than 3 minutes, may use the second spray in other nostril is seizure continuous for 10 minutes, Disp: 2 each, Rfl: 2       Objective   There were no vitals filed for this visit.              Physical Exam  R leaning  Dystonia in BUE and BLE- has some passive ROM but not much  R wrist flexion>L wrist flexion with fingers curled  Non verbal, sitting up in chair  Awake, alert    DBS Procedure Visit  [facility] 2/22/24  IPG 2.95  TI: L-500  R-562---same as other  SI: All OK    Initial Settings  Program 5--is recovery program 6  L GPi  C+2-3- 2.85/120/90 ((0-3.0))  R GPi  C+10-11- 2.85/120/90  ((0-3.0))      Programming  Slowly increased settings watching for change/mother monitoring    Increased PW and amp bilaterally to make 2 new programs with higher settings    There was a time she cried out on highest settings (program 7) and her mother was unsure since she does this anyways but then she laughs and smiles.       PLAN for future visits  Double monopolar with more dorsal contacts ((3-, 4- and 11-,12-)  Could try segments though ring usually used for dystonia      Final Settings  Program 6:  L GP  C+2-3- 3.35/130/90 ((0-3.35))  R GPi  C+10-11- 3.35/130/90 ((0-3.35))    Program 7:  L GP  C+2-3- 3.6/150/90 ((0-3.6))  R GPi  C+10-11- 3.6/150/90 ((0-3.6))    Program 2 ((recovery program same as program 1))  L GPi  C+2-3- 2.75/100/90 ((0-3.0))  R GPi  C+10-11- 2.75/100/90 ((0-3.0))    Program 5---new program  L GPi  C+2-3- 2.85/120/90 ((0-3.0))  R GPi  C+10-11- 2.85/120/90 ((0-3.0))    Program 3 ((now baseline))  L GPi  C+2- 3.5/130/90 ((0-3.5))  R GPi  C+10- 3.5/130/90 ((0-3.5))    Program 4  L GPi  C+2-3- 2.5/90/80/90 ((0-3.0))  R GPi  C+10-11- 2.5/80/90 ((0-3.0))          --------------------Previous Programming------------------------  4/3/24  Initial monopolar review  Watching for SE/subtle changes, she has dystonic tremor in RLE and BUE when trying to move it, parents move extremities throughout review to see if easier since dressing/hygiene are issues for her    C+ /Freq 60 tried but charge density limit reached at 2.5 (no SE) so did traditional MPR with PW 60 and freq at 130,      L GPi   60/130  1- 0.5--RUE slightly looser, at 2.0 she is moving RLE, 2.5 moving RUE some   2- arm goes back to midline until 2.0--looser, 3.5 her RLE straightens more  3-3.0 tighter compared to 1&2 but having more movement in RUE and RLE  4-same, benefit throughout w/ easier lifting but still difficult to move compared to 1-2    W/ DBS off RUE becomes adducted again against her chest again but had improvement  throughout above    R GPi  60/130  9- 1.5 improved ROM LUE and continued to improve with higher setting 4.0 and her arm is no longer adducted to chest.   10- 2.0 she can lift her LUE much easier 3.5 SE neck dystonia/anterocollis and seemed different   11- 1.0 she has better ROM in LUE and 4.0 good ROM to LUE  12- 2.0 better ROM LUE but still difficult      Final settings:  Program 1: ACTIVE   L GPi  C+1- 2.0/60/130 ((0-2.0))  R GPi  C+9- 2.0/60/130 ((0-2.0))      Program 2:  L GPi  C+2- 2.5/60/130  R GPi  C+10- 2.5/60/130 ((0-2.5))    5/1/24  Initial Settings  Program 1: ACTIVE   L GPi  C+1- 2.0/60/130 ((0-2.0))  R GPi  C+9- 2.0/60/130 ((0-2.0))     Programming  Copied 1 to 3 and increased settings to amp 2.5 and PW 90-ROM in arms is better but severe neck pulling. Turned off and improved and back on and re-occurred.      Program 2 tried without SE---copied this to program 3 and increased amp to 3.0 and PW to 80 on both sides---SE R facial twitching and neck pulling. Lowered amp in L GPi to 2.5 and PW 80 bilaterally and no SE---again, ROM is better per her mother. Then neck pulls again down to the R---lowered freq to 90 both sides and was able to increase R GPi amp back to 3.0     However, mother also notes that regardless of DBS mouth sometimes twitches AND her head pulls R but these seemed more severe.  Also, whenever she speaks her arms and legs cross and she straightens when she is more excited.         Program 1- most ventral contact--side effect with higher amp and PW so kept settings unchanged  Program 2- 2nd most ventral contact with some further improvement today  New program 3--higher settings than program 2 well tolerated      Final Settings  Program 2  C+2- 2.5/60/130  R GPi  C+10- 2.5/60/130 ((0-2.5))     Program 3:  C+2- 3.0/70/90 ((1.5-3.0))  R GPi  C+10- 3.0/70/90 ((1.5-3.0))    6/19/24  Initial Settings  Program 3:  L GPi  C+2- 3.0/70/90 ((1.5-3.0))  R GPi  C+10- 3.0/70/90 ((1.5-3.0))    Deleted old  program 1&2    Increased PW and amp to 3.5 and 80 and developed head pulling forward and L facial twitching??---leans R but not usually with her head forward and her mom can straighten her---turned off and back on and no SE and LUE moves better as well as RUE    Mother notes her lips do sometimes twitch so not really a side effect  She was off x 5 mins and had an episode of leaning forward without stimulation so unsure that is a side effect but head not usually in that position.     New program 1 is program 3 with higher settings  New program 2 is program 1 with higher settings    New program 4 created incase above is not tolerated---she felt looser to her mother  L GPi  C+2-3- 2.5/90/80/90  R GPi  C+10-11- 2.5/80/90    Final Settings  Program 1: active  L GPi  C+2- 3.5/80/90 ((0-3.0))  R GPi  C+10- 3.5/80/90 ((0-3.0))    Program 2:  L GPi  C+2- 3.5/90/90 ((0-3.5))  R GPi  C+10- 3.5/90/90 ((0-3.5))    Program 4  L GPi  C+2-3- 2.5/90/80/90 ((0-3.0))  R GPi  C+10-11- 2.5/80/90 ((0-3.0))    Program 3 (baseline)  L GPi  C+2- 3.0/70/90 ((1.5-3.0))  R GPi  C+10- 3.0/70/90 ((1.5-3.0))    8/14/24  Initial Settings  Program 2:  L GPi  C+2- 3.5/90/90 ((0-3.5))  R GPi  C+10- 3.5/90/90 ((0-3.5))        Programming  Deleted program 1&2 as were lower settings than current program 2     Made new program 1 which is higher settings (increased PW) and program 3 which is higher settings than 1 (higher PW)---she tolerated program 3 for 10 mins without SE        PLAN for future visits  Double monopolar with more dorsal contacts  Could try segments though ring usually used for dystonia        Final Settings  Program 1- ACTIVE  L GPi  C+2- 3.5/110/90 ((0-3.5))  R GPi  C+10- 3.5/110/90 ((0-3.5))     Program 2 ((now is her baseline))  L GPi  C+2- 3.5/90/90 ((0-3.5))  R GPi  C+10- 3.5/90/90 ((0-3.5))     Program 3  L GPi  C+2- 3.5/130/90 ((0-3.5))  R GPi  C+10- 3.5/130/90 ((0-3.5))     Program 4  L GPi  C+2-3- 2.5/90/80/90 ((0-3.0))  R  GPi  C+10-11- 2.5/80/90 ((0-3.0))        Assessment/Plan   Ms. Eloise Costa is a 29 y.o. female with generalized dystonia who presents for initial DBS visit for dystonia. She is s/p b/l GPi DBS and L chest generator placement w/ [facility] on 2/22/24. She has had mild improvement, her mother can now wash under her arms easier and patient is chewing better but overall unchanged with last adjustments. Will continue to adjust stimulation as tolerated. Slowly increasing to allow her to tolerate and also she is unable to verbalize SE.     Though patient has dyskinesias with higher dose Sinemet, reported response to medication making caring for her easier but this response has waned over time and we have decreased dose without worsening dystonia, thought to make her cry after dosing but stopping 1 tab BID worsened sx so will keep BID.     Seizures now managed by Dr. Ross and continues on Keppra.     Future plans include OT once able and wheelchair evaluation.       Diagnoses and all orders for this visit:  Generalized dystonia      #Decrease Carbidopa-levodopa 25/100mg to 0.5 tab daily x 1 week, then stop.     #If dystonia improves much more, let me know and we can do occupational therapy consult in the home    Also wheelchair eval planned for the future    #DBS adjusted today      Baseline is the program you came in on today is program 2 ((same as program 1))--you can always go back if side effects on any other programs    But lets stay on program until Jan 19th. If no changes, go to program 5 (higher settings     #Continue other medications unchanged  Baclofen 10mg 1.5tabs 3 times a day  Lorazepam 1mg 0.5 tab 2 times a day    #Follow up March 7th at 8am with me--virtually for DBS visit      Total time of 28 minutes for today's visit, of which 18 mins were spent with DBS programming as noted above-checking settings, stimulator events, energy level, and impedances and updating settings in the chart.    Mehdi  DERRELL BusbyC  Adult/Gerontological Nurse Practitioner   [facility], Department of Neurology  [facility]  [facility]  [address]  [place] [id number]  Phone: [phone]  Fax: [phone]

## 2025-04-10 DIAGNOSIS — G24.9 GENERALIZED DYSTONIA: ICD-10-CM

## 2025-04-10 RX ORDER — BACLOFEN 10 MG/1
20 TABLET ORAL 3 TIMES DAILY
Qty: 540 TABLET | Refills: 3 | Status: SHIPPED | OUTPATIENT
Start: 2025-04-10

## 2025-04-10 RX ORDER — BACLOFEN 10 MG/1
15 TABLET ORAL 3 TIMES DAILY
Qty: 405 TABLET | Refills: 3 | Status: SHIPPED | OUTPATIENT
Start: 2025-04-10 | End: 2025-04-10 | Stop reason: SDUPTHER

## 2025-04-16 ENCOUNTER — APPOINTMENT (OUTPATIENT)
Dept: NEUROLOGY | Facility: CLINIC | Age: 30
End: 2025-04-16
Payer: MEDICAID

## 2025-04-16 DIAGNOSIS — F41.9 ANXIETY: ICD-10-CM

## 2025-04-16 DIAGNOSIS — G24.9 GENERALIZED DYSTONIA: Primary | ICD-10-CM

## 2025-04-16 DIAGNOSIS — G47.00 INSOMNIA, UNSPECIFIED TYPE: ICD-10-CM

## 2025-04-16 PROCEDURE — 99213 OFFICE O/P EST LOW 20 MIN: CPT | Performed by: NURSE PRACTITIONER

## 2025-04-16 PROCEDURE — 95983 ALYS BRN NPGT PRGRMG 15 MIN: CPT | Performed by: NURSE PRACTITIONER

## 2025-04-16 NOTE — PROGRESS NOTES
Subjective     Eloise Costa is a 29 y.o. year old female who presents with dystonia, here for follow up visit.    A video visit between the patient (at the originating site/her home) and the provider (at the distant site) was utilized to provide this telehealth service.     HPI  Presents virtually for DBS programming.   Last visit with me 3/7/25  #DBS adjusted today    Baseline is the program 5, program 6 and 7 are new to try  #Continue meds unchanged  #Follow up 4-6W with me      Issues sleeping, went back to baseline 5 program to see if due to program and no changes in sleep.     Program 6 she could move a little better. Sunday went to program 7 and moves a little better and awkward.   Hands are not as drawn up to to her body.   Legs still difficult to straighten. She does lift her legs more on her own now.  When she does not sleep her dystonia sx are worse.     Mother wants to increase her lorazepam, she is not sure why she is not sleeping. When mom goes in she smiles at them, but is revving her up, then crying, more movement, parents are not sleeping and difficulty caring for her because they are exhausted. Off and on the past few months. Last night they gave her 1.5tabs lorazepam and she slept 4hrs (no longer taking in the am). She cannot seem to calm herself down. Had it bad in 2017 until recently, not every night but enough to be bothersome.    Some days no issues at all, does well and sleep well. No changes overall during the day.     On clonazepam in 2017 and does not remember what it did. Mom thinks lorazepam was for first seizure.     No illness or seizures.     Meds  Sinemet 25/100mg 0.5tabs 2 imes a day (higher doses than 0.5 QID=anxious, crying and no benefit, lower dose had more movements)  Baclofen 10mg 1.5tabs 3 times a day--will try 1 baclofen at hs keeping lorazepam 1.5mg   Lorazepam 1mg 1.5 tab at bedtime      Prior imaging:  CT head WO 11/15/24:  IMPRESSION:  No evidence of acute cortical  infarct or intracranial hemorrhage.    MRI brain 8/11/23:  IMPRESSION:  Volumetric CT performed for surgical planning purposes with findings  as described above.      Past hx:  Genetic testing was done, and upon the last chart update by Dr. 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 She has generalized dystonia which has progressively worsened since she was diagnosed in 2016 (parents report she was normal prior to this), has not walked without assist since 2017. She is completely dependent for ADLs. Exam is very limited today due to being non-verbal, inability to follow commands, limited movement.                      Current Outpatient Medications:     baclofen (Lioresal) 10 mg tablet, Take 2 tablets (20 mg) by mouth 3 times a day., Disp: 540 tablet, Rfl: 3    carbidopa-levodopa (Sinemet)  mg tablet, Take 0.5 tablets by mouth 3 times a day., Disp: 135 tablet, Rfl: 3    docusate sodium (Colace) 100 mg capsule, Take 1 capsule (100 mg) by mouth 2 times a day., Disp: , Rfl:     levETIRAcetam (Keppra) 250 mg tablet, Take 1 tablet (250 mg) by mouth 2 times a day., Disp: 60 tablet, Rfl: 11    LORazepam (Ativan) 1 mg tablet, Take 1/2 (one-half) tablet by mouth twice daily, Disp: 90 tablet, Rfl: 0    nasal spray midazolam (Nayzilam) 5 mg/spray (0.1 mL) spray,non-aerosol, Use one spray in one nostril for a convulsive seizure lasting longer than 3 minutes, may use the second spray in other nostril is seizure continuous for 10 minutes, Disp: 2 each, Rfl: 2       Objective   There were no vitals filed for this visit.              Physical Exam  R leaning  Dystonia in BUE and BLE- has some passive ROM but not much  R wrist flexion>L wrist flexion with fingers curled  Non verbal, sitting up in chair  Awake, alert    DBS Procedure Visit  [facility] 2/22/24  IPG 2.93v  TI: L-475  R-53---same as other  SI: All OK    Initial Settings  Program 7  L GPi  C+2-3- 3.6/150/90  ((0-3.0))  R GPi  C+10-11- 3.6/150/90 ((0-3.0))      Programming  Slowly increased settings watching for change/mother monitoring    Increased PW and amp bilaterally to make 2 new programs with higher settings  ROM improved, picked up LLE which she has not done in a long time. BUE arms are also lower. n      PLAN for future visits  Double monopolar with more dorsal contacts ((3-, 4- and 11-,12-)  Could try segments though ring usually used for dystonia      Final Settings  Program 6: baseline  L GP  C+2-3- 3.35/130/90 ((0-3.35))  R GPi  C+10-11- 3.35/130/90 ((0-3.35))    Program 7: currently on/active   L GP  C+2-3- 3./150/90 ((0-3.6))  R GPi  C+10-11- 3.6/150/90 ((0-3.6))    Program 8:  L GPi  C+2-3- 3.7/160/90 ((0-3.0))  R GPi  C+10-11- 3.7/160/90 ((0-3.0))    Program 9:  L GPi  C+2-3- 3.8/170/90 ((0-3.0))  R GPi  C+10-11- 3.8/170/90 ((0-3.0))      --------------------Previous Programming------------------------  4/3/24  Initial monopolar review  Watching for SE/subtle changes, she has dystonic tremor in RLE and BUE when trying to move it, parents move extremities throughout review to see if easier since dressing/hygiene are issues for her    C+ /Freq 60 tried but charge density limit reached at 2.5 (no SE) so did traditional MPR with PW 60 and freq at 130,      L GPi   60/130  1- 0.5--RUE slightly looser, at 2.0 she is moving RLE, 2.5 moving RUE some   2- arm goes back to midline until 2.0--looser, 3.5 her RLE straightens more  3-3.0 tighter compared to 1&2 but having more movement in RUE and RLE  4-same, benefit throughout w/ easier lifting but still difficult to move compared to 1-2    W/ DBS off RUE becomes adducted again against her chest again but had improvement throughout above    R GPi  60/130  9- 1.5 improved ROM LUE and continued to improve with higher setting 4.0 and her arm is no longer adducted to chest.   10- 2.0 she can lift her LUE much easier 3.5 SE neck dystonia/anterocollis and seemed  different   11- 1.0 she has better ROM in LUE and 4.0 good ROM to LUE  12- 2.0 better ROM LUE but still difficult      Final settings:  Program 1: ACTIVE   L GPi  C+1- 2.0/60/130 ((0-2.0))  R GPi  C+9- 2.0/60/130 ((0-2.0))      Program 2:  L GPi  C+2- 2.5/60/130  R GPi  C+10- 2.5/60/130 ((0-2.5))    5/1/24  Initial Settings  Program 1: ACTIVE   L GPi  C+1- 2.0/60/130 ((0-2.0))  R GPi  C+9- 2.0/60/130 ((0-2.0))     Programming  Copied 1 to 3 and increased settings to amp 2.5 and PW 90-ROM in arms is better but severe neck pulling. Turned off and improved and back on and re-occurred.      Program 2 tried without SE---copied this to program 3 and increased amp to 3.0 and PW to 80 on both sides---SE R facial twitching and neck pulling. Lowered amp in L GPi to 2.5 and PW 80 bilaterally and no SE---again, ROM is better per her mother. Then neck pulls again down to the R---lowered freq to 90 both sides and was able to increase R GPi amp back to 3.0     However, mother also notes that regardless of DBS mouth sometimes twitches AND her head pulls R but these seemed more severe.  Also, whenever she speaks her arms and legs cross and she straightens when she is more excited.         Program 1- most ventral contact--side effect with higher amp and PW so kept settings unchanged  Program 2- 2nd most ventral contact with some further improvement today  New program 3--higher settings than program 2 well tolerated      Final Settings  Program 2  C+2- 2.5/60/130  R GPi  C+10- 2.5/60/130 ((0-2.5))     Program 3:  C+2- 3.0/70/90 ((1.5-3.0))  R GPi  C+10- 3.0/70/90 ((1.5-3.0))    6/19/24  Initial Settings  Program 3:  L GPi  C+2- 3.0/70/90 ((1.5-3.0))  R GPi  C+10- 3.0/70/90 ((1.5-3.0))    Deleted old program 1&2    Increased PW and amp to 3.5 and 80 and developed head pulling forward and L facial twitching??---leans R but not usually with her head forward and her mom can straighten her---turned off and back on and no SE and LUE moves  better as well as RUE    Mother notes her lips do sometimes twitch so not really a side effect  She was off x 5 mins and had an episode of leaning forward without stimulation so unsure that is a side effect but head not usually in that position.     New program 1 is program 3 with higher settings  New program 2 is program 1 with higher settings    New program 4 created incase above is not tolerated---she felt looser to her mother  L GPi  C+2-3- 2.5/90/80/90  R GPi  C+10-11- 2.5/80/90    Final Settings  Program 1: active  L GPi  C+2- 3.5/80/90 ((0-3.0))  R GPi  C+10- 3.5/80/90 ((0-3.0))    Program 2:  L GPi  C+2- 3.5/90/90 ((0-3.5))  R GPi  C+10- 3.5/90/90 ((0-3.5))    Program 4  L GPi  C+2-3- 2.5/90/80/90 ((0-3.0))  R GPi  C+10-11- 2.5/80/90 ((0-3.0))    Program 3 (baseline)  L GPi  C+2- 3.0/70/90 ((1.5-3.0))  R GPi  C+10- 3.0/70/90 ((1.5-3.0))    8/14/24  Initial Settings  Program 2:  L GPi  C+2- 3.5/90/90 ((0-3.5))  R GPi  C+10- 3.5/90/90 ((0-3.5))        Programming  Deleted program 1&2 as were lower settings than current program 2     Made new program 1 which is higher settings (increased PW) and program 3 which is higher settings than 1 (higher PW)---she tolerated program 3 for 10 mins without SE        PLAN for future visits  Double monopolar with more dorsal contacts  Could try segments though ring usually used for dystonia        Final Settings  Program 1- ACTIVE  L GPi  C+2- 3.5/110/90 ((0-3.5))  R GPi  C+10- 3.5/110/90 ((0-3.5))     Program 2 ((now is her baseline))  L GPi  C+2- 3.5/90/90 ((0-3.5))  R GPi  C+10- 3.5/90/90 ((0-3.5))     Program 3  L GPi  C+2- 3.5/130/90 ((0-3.5))  R GPi  C+10- 3.5/130/90 ((0-3.5))     Program 4  L GPi  C+2-3- 2.5/90/80/90 ((0-3.0))  R GPi  C+10-11- 2.5/80/90 ((0-3.0))        Assessment/Plan   Ms. Eloise Costa is a 29 y.o. female with generalized dystonia who presents for initial DBS visit for dystonia. She is s/p b/l GPi DBS and L chest generator placement w/ [facility] on  2/22/24. She has had mild improvement, her mother can now wash under her arms easier and patient is chewing better, maybe some mild improvement further. Will continue to adjust stimulation as tolerated. Slowly increasing to allow her to tolerate and also she is unable to verbalize SE.    Seizures now managed by Dr. Ross and continues on Keppra.     Insomnia: D/t anxiety her mother feels. I advised not to increase lorazepam higher at this time, will d/w Dr. Navarro d/t dystonia, anxiety, hx of seizures, if should try clonazepam vs Ambien her mother is asking about. Trazodone and Remeron may lower seizure threshold so these are not good options.    Future plans include OT once able and wheelchair evaluation.       Diagnoses and all orders for this visit:  Generalized dystonia  Anxiety  Insomnia, unspecified type        #DBS adjusted today      Baseline is the program 6  Currently on program 7--stay at this program for 3 weeks  New program 8--then try this program for 3 weeks and if she tolerates continue to increase to 9  New program 9    #Continue meds unchanged    I will discuss insomnia with Dr. Navarro     #Follow up 4-6W with me      Total time of 41 minutes for today's visit, of which 20 mins were spent with DBS programming as noted above-checking settings, stimulator events, energy level, and impedances and updating settings in the chart. I, KEELY Busby, personally performed  a medically appropriate exam, discussion of care/treatment options taking a total time of 21 minutes for remaining time.       Mehdi Busby, KEELY-C  Adult/Gerontological Nurse Practitioner   [facility], Department of Neurology  [facility]  [address]  [place] [id number]  Phone: [phone]  Fax: [phone]

## 2025-04-16 NOTE — PATIENT INSTRUCTIONS
#DBS adjusted today      Baseline is the program 6  Currently on program 7--stay at this program for 3 weeks  New program 8--then try this program for 3 weeks and if she tolerates continue to increase to 9  New program 9    #Continue meds unchanged    I will discuss insomnia with Dr. Navarro     #Follow up 4-6W with me    Mehdi Busby NP-C  Adult/Gerontological Nurse Practitioner   [facility], Department of Neurology  [facility] [place]  [facility]  [address]  [place] [id number]  Phone: [phone]  Fax: [phone]

## 2025-04-21 ENCOUNTER — DOCUMENTATION (OUTPATIENT)
Dept: NEUROLOGY | Facility: CLINIC | Age: 30
End: 2025-04-21
Payer: MEDICAID

## 2025-04-21 NOTE — PROGRESS NOTES
Late entry.     Friday 4/18/25 I d/w Dr. Navarro increasing lorazepam for sleep as discussed with patient's mother last visit. Dr. Navarro noted OK to increase to 2mg at hs. I discussed with her mother Friday as well and to monitor for excess sedation. She will let me know when due for refill.

## 2025-05-01 ENCOUNTER — PATIENT MESSAGE (OUTPATIENT)
Dept: NEUROLOGY | Facility: CLINIC | Age: 30
End: 2025-05-01
Payer: MEDICAID

## 2025-05-01 DIAGNOSIS — F41.9 ANXIETY: ICD-10-CM

## 2025-05-01 DIAGNOSIS — G24.9 GENERALIZED DYSTONIA: ICD-10-CM

## 2025-05-01 RX ORDER — LORAZEPAM 1 MG/1
2 TABLET ORAL NIGHTLY
Qty: 60 TABLET | Refills: 2 | Status: SHIPPED | OUTPATIENT
Start: 2025-05-01

## 2025-05-09 ENCOUNTER — PATIENT MESSAGE (OUTPATIENT)
Dept: NEUROLOGY | Facility: CLINIC | Age: 30
End: 2025-05-09
Payer: MEDICAID

## 2025-05-09 DIAGNOSIS — G47.00 INSOMNIA, UNSPECIFIED TYPE: Primary | ICD-10-CM

## 2025-05-09 RX ORDER — TRAZODONE HYDROCHLORIDE 50 MG/1
TABLET ORAL
Qty: 30 TABLET | Refills: 3 | Status: SHIPPED | OUTPATIENT
Start: 2025-05-09 | End: 2025-05-09 | Stop reason: ALTCHOICE

## 2025-06-03 ENCOUNTER — PATIENT MESSAGE (OUTPATIENT)
Dept: NEUROLOGY | Facility: CLINIC | Age: 30
End: 2025-06-03
Payer: MEDICAID

## 2025-06-03 DIAGNOSIS — G47.01 INSOMNIA DUE TO MEDICAL CONDITION: Primary | ICD-10-CM

## 2025-06-06 RX ORDER — MIRTAZAPINE 7.5 MG/1
7.5 TABLET, FILM COATED ORAL NIGHTLY
Qty: 30 TABLET | Refills: 0 | Status: SHIPPED | OUTPATIENT
Start: 2025-06-06 | End: 2025-07-06

## 2025-06-18 ENCOUNTER — PATIENT MESSAGE (OUTPATIENT)
Dept: NEUROLOGY | Facility: CLINIC | Age: 30
End: 2025-06-18
Payer: MEDICAID

## 2025-06-20 ENCOUNTER — TELEPHONE (OUTPATIENT)
Dept: NEUROLOGY | Facility: HOSPITAL | Age: 30
End: 2025-06-20

## 2025-06-20 ENCOUNTER — APPOINTMENT (OUTPATIENT)
Dept: CARDIOLOGY | Facility: HOSPITAL | Age: 30
End: 2025-06-20
Payer: MEDICAID

## 2025-06-20 ENCOUNTER — HOSPITAL ENCOUNTER (EMERGENCY)
Facility: HOSPITAL | Age: 30
Discharge: HOME | End: 2025-06-21
Attending: STUDENT IN AN ORGANIZED HEALTH CARE EDUCATION/TRAINING PROGRAM
Payer: MEDICAID

## 2025-06-20 ENCOUNTER — APPOINTMENT (OUTPATIENT)
Dept: RADIOLOGY | Facility: HOSPITAL | Age: 30
End: 2025-06-20
Payer: MEDICAID

## 2025-06-20 DIAGNOSIS — R56.9 SEIZURE-LIKE ACTIVITY (MULTI): Primary | ICD-10-CM

## 2025-06-20 DIAGNOSIS — R56.9 SEIZURE (MULTI): Primary | ICD-10-CM

## 2025-06-20 LAB
BASOPHILS # BLD AUTO: 0.06 X10*3/UL (ref 0–0.1)
BASOPHILS NFR BLD AUTO: 0.6 %
EOSINOPHIL # BLD AUTO: 0.31 X10*3/UL (ref 0–0.7)
EOSINOPHIL NFR BLD AUTO: 3 %
ERYTHROCYTE [DISTWIDTH] IN BLOOD BY AUTOMATED COUNT: 11 % (ref 11.5–14.5)
HCT VFR BLD AUTO: 41.6 % (ref 36–46)
HGB BLD-MCNC: 14.7 G/DL (ref 12–16)
IMM GRANULOCYTES # BLD AUTO: 0.02 X10*3/UL (ref 0–0.7)
IMM GRANULOCYTES NFR BLD AUTO: 0.2 % (ref 0–0.9)
LYMPHOCYTES # BLD AUTO: 2.72 X10*3/UL (ref 1.2–4.8)
LYMPHOCYTES NFR BLD AUTO: 26.2 %
MCH RBC QN AUTO: 33.3 PG (ref 26–34)
MCHC RBC AUTO-ENTMCNC: 35.3 G/DL (ref 32–36)
MCV RBC AUTO: 94 FL (ref 80–100)
MONOCYTES # BLD AUTO: 0.74 X10*3/UL (ref 0.1–1)
MONOCYTES NFR BLD AUTO: 7.1 %
NEUTROPHILS # BLD AUTO: 6.52 X10*3/UL (ref 1.2–7.7)
NEUTROPHILS NFR BLD AUTO: 62.9 %
NRBC BLD-RTO: 0 /100 WBCS (ref 0–0)
PLATELET # BLD AUTO: 209 X10*3/UL (ref 150–450)
RBC # BLD AUTO: 4.42 X10*6/UL (ref 4–5.2)
WBC # BLD AUTO: 10.4 X10*3/UL (ref 4.4–11.3)

## 2025-06-20 PROCEDURE — 71045 X-RAY EXAM CHEST 1 VIEW: CPT | Performed by: RADIOLOGY

## 2025-06-20 PROCEDURE — 83735 ASSAY OF MAGNESIUM: CPT | Performed by: HEALTH CARE PROVIDER

## 2025-06-20 PROCEDURE — 80177 DRUG SCRN QUAN LEVETIRACETAM: CPT | Mod: GEALAB | Performed by: STUDENT IN AN ORGANIZED HEALTH CARE EDUCATION/TRAINING PROGRAM

## 2025-06-20 PROCEDURE — 80053 COMPREHEN METABOLIC PANEL: CPT | Performed by: HEALTH CARE PROVIDER

## 2025-06-20 PROCEDURE — 85610 PROTHROMBIN TIME: CPT | Performed by: HEALTH CARE PROVIDER

## 2025-06-20 PROCEDURE — 85025 COMPLETE CBC W/AUTO DIFF WBC: CPT | Performed by: HEALTH CARE PROVIDER

## 2025-06-20 PROCEDURE — 99285 EMERGENCY DEPT VISIT HI MDM: CPT | Performed by: STUDENT IN AN ORGANIZED HEALTH CARE EDUCATION/TRAINING PROGRAM

## 2025-06-20 PROCEDURE — 93005 ELECTROCARDIOGRAM TRACING: CPT

## 2025-06-20 PROCEDURE — 83605 ASSAY OF LACTIC ACID: CPT | Performed by: HEALTH CARE PROVIDER

## 2025-06-20 PROCEDURE — 71045 X-RAY EXAM CHEST 1 VIEW: CPT

## 2025-06-20 PROCEDURE — 36415 COLL VENOUS BLD VENIPUNCTURE: CPT | Performed by: STUDENT IN AN ORGANIZED HEALTH CARE EDUCATION/TRAINING PROGRAM

## 2025-06-20 RX ORDER — LEVETIRACETAM 1000 MG/1
1000 TABLET ORAL 2 TIMES DAILY
Qty: 60 TABLET | Refills: 2 | Status: CANCELLED | OUTPATIENT
Start: 2025-06-20 | End: 2026-06-20

## 2025-06-20 ASSESSMENT — PAIN - FUNCTIONAL ASSESSMENT: PAIN_FUNCTIONAL_ASSESSMENT: UNABLE TO SELF-REPORT

## 2025-06-21 VITALS
SYSTOLIC BLOOD PRESSURE: 129 MMHG | DIASTOLIC BLOOD PRESSURE: 91 MMHG | BODY MASS INDEX: 17.07 KG/M2 | OXYGEN SATURATION: 97 % | WEIGHT: 100 LBS | HEIGHT: 64 IN | RESPIRATION RATE: 18 BRPM | TEMPERATURE: 98.6 F | HEART RATE: 84 BPM

## 2025-06-21 LAB
ALBUMIN SERPL BCP-MCNC: 4.7 G/DL (ref 3.4–5)
ALP SERPL-CCNC: 81 U/L (ref 33–110)
ALT SERPL W P-5'-P-CCNC: 38 U/L (ref 7–45)
ANION GAP SERPL CALC-SCNC: 15 MMOL/L (ref 10–20)
APPEARANCE UR: CLEAR
AST SERPL W P-5'-P-CCNC: 31 U/L (ref 9–39)
BASE EXCESS BLDV CALC-SCNC: 4.5 MMOL/L (ref -2–3)
BILIRUB SERPL-MCNC: 0.5 MG/DL (ref 0–1.2)
BILIRUB UR STRIP.AUTO-MCNC: NEGATIVE MG/DL
BODY TEMPERATURE: ABNORMAL
BUN SERPL-MCNC: 11 MG/DL (ref 6–23)
CALCIUM SERPL-MCNC: 9.2 MG/DL (ref 8.6–10.3)
CHLORIDE SERPL-SCNC: 101 MMOL/L (ref 98–107)
CO2 SERPL-SCNC: 24 MMOL/L (ref 21–32)
COLOR UR: COLORLESS
CREAT SERPL-MCNC: 0.63 MG/DL (ref 0.5–1.05)
EGFRCR SERPLBLD CKD-EPI 2021: >90 ML/MIN/1.73M*2
GLUCOSE SERPL-MCNC: 102 MG/DL (ref 74–99)
GLUCOSE UR STRIP.AUTO-MCNC: NORMAL MG/DL
HCO3 BLDV-SCNC: 29.8 MMOL/L (ref 22–26)
INHALED O2 CONCENTRATION: 21 %
INR PPP: 1.1 (ref 0.9–1.1)
KETONES UR STRIP.AUTO-MCNC: NEGATIVE MG/DL
LACTATE SERPL-SCNC: 1.5 MMOL/L (ref 0.4–2)
LACTATE SERPL-SCNC: 2.7 MMOL/L (ref 0.4–2)
LEUKOCYTE ESTERASE UR QL STRIP.AUTO: NEGATIVE
LEVETIRACETAM SERPL-MCNC: 16 UG/ML (ref 10–40)
MAGNESIUM SERPL-MCNC: 2.26 MG/DL (ref 1.6–2.4)
NITRITE UR QL STRIP.AUTO: NEGATIVE
OXYHGB MFR BLDV: 72 % (ref 45–75)
PCO2 BLDV: 46 MM HG (ref 41–51)
PH BLDV: 7.42 PH (ref 7.33–7.43)
PH UR STRIP.AUTO: 6 [PH]
PO2 BLDV: 42 MM HG (ref 35–45)
POTASSIUM SERPL-SCNC: 3.8 MMOL/L (ref 3.5–5.3)
PROT SERPL-MCNC: 7.8 G/DL (ref 6.4–8.2)
PROT UR STRIP.AUTO-MCNC: NEGATIVE MG/DL
PROTHROMBIN TIME: 12.1 SECONDS (ref 9.8–12.4)
RBC # UR STRIP.AUTO: NEGATIVE MG/DL
SAO2 % BLDV: 73 % (ref 45–75)
SODIUM SERPL-SCNC: 136 MMOL/L (ref 136–145)
SP GR UR STRIP.AUTO: 1.01
UROBILINOGEN UR STRIP.AUTO-MCNC: NORMAL MG/DL

## 2025-06-21 PROCEDURE — 36415 COLL VENOUS BLD VENIPUNCTURE: CPT | Performed by: STUDENT IN AN ORGANIZED HEALTH CARE EDUCATION/TRAINING PROGRAM

## 2025-06-21 PROCEDURE — P9612 CATHETERIZE FOR URINE SPEC: HCPCS

## 2025-06-21 PROCEDURE — 81003 URINALYSIS AUTO W/O SCOPE: CPT | Performed by: HEALTH CARE PROVIDER

## 2025-06-21 PROCEDURE — 83605 ASSAY OF LACTIC ACID: CPT | Performed by: HEALTH CARE PROVIDER

## 2025-06-21 PROCEDURE — 82805 BLOOD GASES W/O2 SATURATION: CPT | Performed by: STUDENT IN AN ORGANIZED HEALTH CARE EDUCATION/TRAINING PROGRAM

## 2025-06-21 PROCEDURE — 2500000004 HC RX 250 GENERAL PHARMACY W/ HCPCS (ALT 636 FOR OP/ED): Performed by: STUDENT IN AN ORGANIZED HEALTH CARE EDUCATION/TRAINING PROGRAM

## 2025-06-21 RX ADMIN — SODIUM CHLORIDE 500 ML: 0.9 INJECTION, SOLUTION INTRAVENOUS at 00:35

## 2025-06-21 NOTE — ED PROVIDER NOTES
HPI   Chief Complaint   Patient presents with    Seizures     BIBA for two witnessed seizures at home. Per parents, pt had one seizure at 4:45 pm that lasted around 4 minutes, and another seizure around 10:30 pm hat lasted 1.5 minutes. Not postictal. No rescue meds given. Normal nighttime ativan, remeron, baclofen, and keppra were given by parents prior to arrival. Pt has seen neurology before and nothing was found.        CC: Seizure  HPI:   28 y.o. 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  Parent reports first episode happened around 10:00 this morning however she did not witness it her  did stated that lasted and may be 2 minutes.  She did not appear to have a tonic-clonic seizure she immediately came back to her normal baseline mentation, mother mentioned her previous episodes were very similar to the ones that occurred today the second episode lasted approximately 1 minute that was witnessed by the mother shortly after patient came out of the shower she was lying on the floor she raised her arms and then started gasping for air some foaming at the mouth and/or gurgling eyes rolled back.  No incontinence or loss of bowel control.  Parent denies patient having any fevers, no reported vomiting and she appears to be at her normal baseline she is on Keppra 250 mg twice daily, she did not give any of her Nayzilam    Additional Limitations to History:   External Records Reviewed: I reviewed recent and relevant outside records including   History Obtained From:     Past Medical History: Per HPI  Medications: Reviewed in EMR and with patient  Allergies:  Reviewed in EMR  Past Surgical History:   Social History:     ------------------------------------------------------------------------------------------------------  Physical Exam:  --Vital signs reviewed in nursing  triage note, EMR flow sheets, and at patient's bedside  GEN:  awake, nonverbal.  EYES: EOMI, non-injected sclera.  ENT: Moist mucous membranes, no apparent injuries or lesions.   CARDIO: Sinus tachycardic no murmurs, rubs, or gallops.  2+ equal pulses of the distal extremities.   PULM: Clear to auscultation bilaterally. No rales, rhonchi, or wheezes. Good symmetric chest expansion.  GI: Soft, non-tender, non-distended. No rebound tenderness or guarding.  SKIN: Warm and dry, no rashes or lesions.  MSK: upper ext contractures.   EXT: No peripheral edema, contusions, or wounds.   -------------------------------------------------------------------------------------------------------      Differential Diagnoses Considered:   Chronic Medical Conditions Significantly Affecting Care:   Diagnostic testing considered: [PERC, D-Dimer, PECARN, etc.]    - EKG interpreted by myself sinus rhythm ventricular rate 94 GA interval 368 normal QRS duration, prolonged QTc  - I independently interpreted: [CXR, CT, POCUS, etc. including your interpretation]  - Labs notable for     Escalation of Care: Appropriate for   Social Determinants of Health Significantly Affecting Care: [Homelessness, lacking transportation, uninsured, unable to afford medications]  Prescription Drug Consideration: [Antibiotics, antivirals, pain medications, etc.]  Discussion of Management with Other Providers:  I discussed the patient/results with: [admitting team, consultant, radiologist, social work, EPAT, case management, PT/OT, RT, PCP, etc.]      Barrington Moya PA-C              Patient History   Medical History[1]  Surgical History[2]  Family History[3]  Social History[4]    Physical Exam   ED Triage Vitals [06/20/25 2306]   Temperature Heart Rate Respirations BP   36.9 °C (98.4 °F) (!) 102 15 (!) 138/97      Pulse Ox Temp Source Heart Rate Source Patient Position   98 % Temporal Monitor --      BP Location FiO2 (%)     Right arm --       Physical Exam      ED  Course & MDM                  No data recorded                                 Medical Decision Making      Procedure  Procedures       [1]   Past Medical History:  Diagnosis Date    ADHD     Anxiety     Cerebellar degeneration     Cognitive decline     Dystonia     Nonverbal     Panic attacks     Patient underweight     Seizure (Multi) 2021    grand mal    Systolic hypertension in child    [2]   Past Surgical History:  Procedure Laterality Date    CT HEAD WO IV CONTRAST  08/11/2023    Under anesthesia    MR BRAIN W AND WO CONTRAST  08/11/2023    Under anesthesia    NO PAST SURGERIES     [3]   Family History  Problem Relation Name Age of Onset    No Known Problems Mother      Hyperlipidemia Father      Hyperlipidemia Sister     [4]   Social History  Tobacco Use    Smoking status: Never    Smokeless tobacco: Never   Vaping Use    Vaping status: Never Used   Substance Use Topics    Alcohol use: Never    Drug use: Never        Barrington Moya PA-C  06/20/25 [id number]     laboratory workup, discussed with the oncoming ED attending internal for final disposition, reassessment.        Procedure  Procedures       Barrington Moya PA-C  06/20/25 [id number]       [1]   Past Medical History:  Diagnosis Date    ADHD     Anxiety     Cerebellar degeneration     Cognitive decline     Dystonia     Nonverbal     Panic attacks     Patient underweight     Seizure (Multi) 2021    grand mal    Systolic hypertension in child    [2]   Past Surgical History:  Procedure Laterality Date    CT HEAD WO IV CONTRAST  08/11/2023    Under anesthesia    MR BRAIN W AND WO CONTRAST  08/11/2023    Under anesthesia    NO PAST SURGERIES     [3]   Family History  Problem Relation Name Age of Onset    No Known Problems Mother      Hyperlipidemia Father      Hyperlipidemia Sister     [4]   Social History  Tobacco Use    Smoking status: Never    Smokeless tobacco: Never   Vaping Use    Vaping status: Never Used   Substance Use Topics    Alcohol use: Never    Drug use: Never        Barrington Moya PA-C  06/23/25 [id number]

## 2025-06-23 LAB
ATRIAL RATE: 94 BPM
HOLD SPECIMEN: NORMAL
P AXIS: 23 DEGREES
P OFFSET: 131 MS
P ONSET: 111 MS
PR INTERVAL: 368 MS
Q ONSET: 159 MS
QRS COUNT: 16 BEATS
QRS DURATION: 180 MS
QT INTERVAL: 454 MS
QTC CALCULATION(BAZETT): 567 MS
QTC FREDERICIA: 527 MS
R AXIS: 33 DEGREES
T AXIS: 27 DEGREES
T OFFSET: 386 MS
VENTRICULAR RATE: 94 BPM

## 2025-06-23 RX ORDER — LEVETIRACETAM 500 MG/1
500 TABLET ORAL 2 TIMES DAILY
Qty: 60 TABLET | Refills: 2 | Status: SHIPPED | OUTPATIENT
Start: 2025-06-23 | End: 2026-06-23

## 2025-06-23 NOTE — PROGRESS NOTES
Received phone call from patients mother who reported that patient had 2 breakthrough seizures in one day. She was taken to the emergency department. Keppra level obtained in ER was low normal after receiving loading dose.     Recommended increasing Keppra to 500 mg BID. Script sent to preferred pharmacy.

## 2025-06-23 NOTE — PATIENT COMMUNICATION
I spoke with her mother.   On Friday 6/20 5pm and 10pm had seizures so went to ED    Checked for UTI and workup was negative. She even called and asked the ED about results today.     She spoke with Dr. Ross's fellow. Going this am to get blood work (was a trough).  She is aware Remeron started and clonazepam weaned off.    Previously her Lorazepam was 0.5-1mg at night per mother depending on anxiety, at the most 1mg. She had been increased up to 2mg for 2 weeks for sleep after d/w Dr. Navarro, then weaned down to start Remeron after we d/w Dr. Melissa Laird.   Since Saturday June 6th she started Remeron, she has been on lorazepam 0.5mg nightly since the day before and continues on that and continues on the Remeron.   I will send Dr. Rodriguez an update.     She is sleeping better though.     She will call Joan to reschedule DBS apt out 6-8 weeks with me since she has not made changes to DBS.

## 2025-06-25 ENCOUNTER — TELEMEDICINE (OUTPATIENT)
Dept: NEUROLOGY | Facility: CLINIC | Age: 30
End: 2025-06-25
Payer: MEDICAID

## 2025-06-25 ENCOUNTER — APPOINTMENT (OUTPATIENT)
Dept: NEUROLOGY | Facility: CLINIC | Age: 30
End: 2025-06-25
Payer: MEDICAID

## 2025-06-25 DIAGNOSIS — R56.9 SEIZURES (MULTI): Primary | ICD-10-CM

## 2025-06-25 LAB — LEVETIRACETAM SERPL-MCNC: 7.5 MCG/ML

## 2025-06-25 PROCEDURE — 98012 SYNCH AUDIO-ONLY EST SF 10: CPT | Performed by: NURSE PRACTITIONER

## 2025-06-26 ENCOUNTER — OFFICE VISIT (OUTPATIENT)
Dept: CARDIOLOGY | Facility: CLINIC | Age: 30
End: 2025-06-26
Payer: MEDICAID

## 2025-06-26 VITALS
HEIGHT: 64 IN | DIASTOLIC BLOOD PRESSURE: 60 MMHG | SYSTOLIC BLOOD PRESSURE: 110 MMHG | HEART RATE: 75 BPM | BODY MASS INDEX: 17.16 KG/M2

## 2025-06-26 DIAGNOSIS — E78.49 FAMILIAL HYPERLIPIDEMIA: ICD-10-CM

## 2025-06-26 DIAGNOSIS — R56.9 SEIZURES (MULTI): Primary | ICD-10-CM

## 2025-06-26 PROCEDURE — 99212 OFFICE O/P EST SF 10 MIN: CPT | Mod: 25

## 2025-06-26 PROCEDURE — 3078F DIAST BP <80 MM HG: CPT | Performed by: INTERNAL MEDICINE

## 2025-06-26 PROCEDURE — 93005 ELECTROCARDIOGRAM TRACING: CPT | Performed by: INTERNAL MEDICINE

## 2025-06-26 PROCEDURE — 93010 ELECTROCARDIOGRAM REPORT: CPT | Performed by: INTERNAL MEDICINE

## 2025-06-26 PROCEDURE — 99203 OFFICE O/P NEW LOW 30 MIN: CPT | Performed by: INTERNAL MEDICINE

## 2025-06-26 PROCEDURE — 3074F SYST BP LT 130 MM HG: CPT | Performed by: INTERNAL MEDICINE

## 2025-06-26 NOTE — PROGRESS NOTES
Name : Eloise Costa    : 1995   MRN : [id number]   ENC Date : 25     Reason for visit: Possible syncope    Assessment and Plan:  Syncope versus seizure disorder: Patient was referred by neurology to assess for possible syncope as the cause of her convulsions.  Her neurologic disorder is not necessarily associated with any cardiac issues per a brief literature search.  It can be associated with some exaggerated heart rate and changes in circadian rhythms.  But it does not appear to be associate with cardiomyopathy or significant arrhythmias.  That said it certainly reasonable to get an echocardiogram and Holter monitor.  We discussed implantable loop recorder but I think a 14-day monitor would likely suffice given that she does have plausible neurologic reasons for her convulsions.  Patient's parents were agreeable with this plan.  Familial hyperlipidemia: Patient has quite elevated LDL.  Apparently her father is the source of the familial hyperlipidemia with her sisters also having elevated cholesterol.  Given the chronic muscle changes with her neurologic disorder it is probably reasonable to avoid statin therapy though this probably could be safely trialed.  That said she likely will get a much better long-term result from PCSK9 inhibitor therapy then statin therapy.  Her father also required PCSK9 inhibitor therapy for treatment effect.  Family was agreeable with starting this therapy.  Disp: Phone follow-up with echocardiogram and Holter monitor results.      HPI:  Patient is seen today for evaluation of possible cardiac causes of seizure-like disorder.  Patient has had multiple convulsive episodes which appear to be seizure-like.  She cannot offer any history and we do not have a good sense as to whether or not she is having any other syncopal type events.  Her neurologic disorder is not typically associated with cardiac complications per a brief literature search I did today.  Patient's mother  "describes her events as eyes rolling back in the head, patient going stiff and pushing her arms away from her body, ultimately having some foaming at the mouth as well.  Given her underlying baseline mental status it is difficult to know whether she is having any postictal type symptoms or not.      Problem List: Problem List[1]     Meds: Medications Ordered Prior to Encounter[2]    All: Allergies[3]    Fam Hx: Family History[4]    Soc Hx:   Social History     Socioeconomic History    Marital status: Single     Spouse name: Not on file    Number of children: Not on file    Years of education: Not on file    Highest education level: Not on file   Occupational History    Not on file   Tobacco Use    Smoking status: Never    Smokeless tobacco: Never   Vaping Use    Vaping status: Never Used   Substance and Sexual Activity    Alcohol use: Never    Drug use: Never    Sexual activity: Defer   Other Topics Concern    Not on file   Social History Narrative    Not on file     Social Drivers of Health     Financial Resource Strain: Low Risk  (2/23/2024)    Overall Financial Resource Strain (CARDIA)     Difficulty of Paying Living Expenses: Not hard at all   Food Insecurity: Not on file   Transportation Needs: No Transportation Needs (2/23/2024)    PRAPARE - Transportation     Lack of Transportation (Medical): No     Lack of Transportation (Non-Medical): No   Physical Activity: Not on file   Stress: Not on file   Social Connections: Not on file   Intimate Partner Violence: Not on file   Housing Stability: Low Risk  (2/23/2024)    Housing Stability Vital Sign     Unable to Pay for Housing in the Last Year: No     Number of Places Lived in the Last Year: 1     Unstable Housing in the Last Year: No       VS: /60 (BP Location: Left arm, Patient Position: Sitting)   Pulse 75   Ht 1.626 m (5' 4\")   BMI 17.16 kg/m²      Physical Exam  Vitals reviewed.   Constitutional:       General: She is awake.      Appearance: She is " underweight. She is ill-appearing.      Comments: Seated in a wheelchair   Eyes:      Pupils: Pupils are equal, round, and reactive to light.   Neck:      Vascular: No JVD.   Cardiovascular:      Rate and Rhythm: Normal rate and regular rhythm.      Pulses: Normal pulses.      Heart sounds: No murmur heard.     No gallop.   Pulmonary:      Effort: No respiratory distress.      Breath sounds: No wheezing or rales.   Abdominal:      General: Abdomen is flat. There is no distension.      Palpations: Abdomen is soft.   Musculoskeletal:         General: No swelling.      Right lower leg: No edema.      Left lower leg: No edema.   Neurological:      General: No focal deficit present.      Mental Status: Mental status is at baseline.      Motor: Weakness, atrophy and abnormal muscle tone present.   Psychiatric:         Mood and Affect: Mood normal.         Speech: She is noncommunicative.            Encounter Date: 06/20/25   ECG 12 lead   Result Value    Ventricular Rate 94    Atrial Rate 94    MN Interval 368    QRS Duration 180    QT Interval 454    QTC Calculation(Bazett) 567    P Axis 23    R Axis 33    T Axis 27    QRS Count 16    Q Onset 159    P Onset 111    P Offset 131    T Offset 386    QTC Fredericia 527    Narrative    Sinus rhythm with 1st degree AV block  Right atrial enlargement  Nonspecific intraventricular block  Abnormal ECG  When compared with ECG of 14-NOV-2017 08:53,  MN interval has increased  Questionable change in QRS duration          Guanaco Carrion MD        [1]   Patient Active Problem List  Diagnosis    Anxiety    Dystonia    ADHD    Seizures (Multi)    HTN (hypertension)    Seizure (Multi)   [2]   Current Outpatient Medications on File Prior to Visit   Medication Sig Dispense Refill    baclofen (Lioresal) 10 mg tablet Take 2 tablets (20 mg) by mouth 3 times a day. (Patient taking differently: Take 1.5 tablets (15 mg) by mouth 3 times a day.) 540 tablet 3    carbidopa-levodopa (Sinemet)   mg tablet Take 0.5 tablets by mouth 3 times a day. (Patient taking differently: Take 0.5 tablets by mouth once daily.) 135 tablet 3    docusate sodium (Colace) 100 mg capsule Take 1 capsule (100 mg) by mouth 2 times a day.      levETIRAcetam (Keppra) 500 mg tablet Take 1 tablet (500 mg) by mouth 2 times a day. 60 tablet 2    LORazepam (Ativan) 1 mg tablet Take 2 tablets (2 mg) by mouth once daily at bedtime. (Patient taking differently: Take 0.5 tablets (0.5 mg) by mouth once daily at bedtime.) 60 tablet 2    mirtazapine (Remeron) 7.5 mg tablet Take 1 tablet (7.5 mg) by mouth once daily at bedtime. 30 tablet 0    nasal spray midazolam (Nayzilam) 5 mg/spray (0.1 mL) spray,non-aerosol Use one spray in one nostril for a convulsive seizure lasting longer than 3 minutes, may use the second spray in other nostril is seizure continuous for 10 minutes (Patient not taking: Reported on 6/26/2025) 2 each 2    [DISCONTINUED] levETIRAcetam (Keppra) 250 mg tablet Take 1 tablet (250 mg) by mouth 2 times a day. 60 tablet 11     No current facility-administered medications on file prior to visit.   [3] No Known Allergies  [4]   Family History  Problem Relation Name Age of Onset    No Known Problems Mother      Hyperlipidemia Father      Hyperlipidemia Sister

## 2025-06-27 NOTE — PROGRESS NOTES
Virtual or Telephone Consent    An interactive audio and video telecommunication system which permits real time communications between the patient (at the originating site) and provider (at the distant site) was utilized to provide this telehealth service.   Verbal consent was requested and obtained from Eloise Costa on this date, 06/27/25 for a telehealth visit and the patient's location was confirmed at the time of the visit.      Ms. Costa is a 28 y.o. 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   Today she presents with her mother and father.  Most recent event concerning for a seizure was November 2024. This was the patient's second event, the first seizure was 3 years ago. Both events in the setting of sleep deprivation.    Sz semiology per father and mother:  . Nov event: episode was ta 5AM. Patient uses a baby monitor. Mother was alerted by some noises, when she walked into the patient's room patient was laying on bed, eyes were closed and she was grasping for air. She was foaming at the mouth, no blood. By the time EMS came, she was back to herself.    . Patient had a previous event 3 years prior ~2021. ~9AM mom woke up the patient, mom walked patient to the bathroom, the she noticed eyes rolled back and patient became stiff less than 1 minute, then went limp. Back to baseline after 1 minute.     Had 2 days monitoring with EEG ~6 years ago. No recent EEG, DVS truned on.     PRESENT CONCERNS:  Patient had seizure 6/20 and was taken to ED, her  keppra level was low so increased to 500mg bid. Since increase she has been drowsy- mom had question regarding her lorazepam and baclofen dosing along with night time dose of keppra. She has been slowly weaning off lorazepam since starting remeron. Last night she had 0.25mg of ativan and 15mg of baclofen instead of 20. Eloise was less tired this am but still  antonino.     Plan:  Continue levetiracetalm 500mg bid may take 10-14 days to adjust to higher dose  Ok to continue lorazepam 0.25mg - may be able to stop soon   Ok to continue baclofen at 15mg until she adjusts to higher LEV dosing  LEV level  Nayzilam for rescue medication to prevent status epilepticus. Use one spray in one nostril for a convulsive seizure lasting longer than 3 minutes, may use the second spray in other nostril is seizure continuous for 10 minutes.  PCP to consider event monitor to cardiology referral to rule out any cardiac arrhythmia.  Seizure precautions.  FU in 6 months    The total appointment time today was 12  minutes. Time included preparing to see the patient, obtaining the history, performing a medically necessary appropriate physical examination, counseling and educating the patient/family/caregiver, ordering medications, tests and procedures, referring and communicating with other providers, independently interpreting results and communicating the results to the patient/family/caregiver, care coordination] and documenting clinical information in the medical record.

## 2025-06-30 DIAGNOSIS — E78.49 FAMILIAL HYPERLIPIDEMIA: ICD-10-CM

## 2025-07-01 ENCOUNTER — SPECIALTY PHARMACY (OUTPATIENT)
Dept: PHARMACY | Facility: CLINIC | Age: 30
End: 2025-07-01

## 2025-07-01 DIAGNOSIS — G47.01 INSOMNIA DUE TO MEDICAL CONDITION: ICD-10-CM

## 2025-07-01 RX ORDER — MIRTAZAPINE 7.5 MG/1
7.5 TABLET, FILM COATED ORAL NIGHTLY
Qty: 90 TABLET | Refills: 3 | Status: SHIPPED | OUTPATIENT
Start: 2025-07-01 | End: 2025-07-31

## 2025-07-14 ENCOUNTER — TELEPHONE (OUTPATIENT)
Facility: CLINIC | Age: 30
End: 2025-07-14
Payer: MEDICAID

## 2025-07-14 ENCOUNTER — HOSPITAL ENCOUNTER (OUTPATIENT)
Dept: RADIOLOGY | Facility: HOSPITAL | Age: 30
Discharge: HOME | End: 2025-07-14
Payer: MEDICAID

## 2025-07-14 DIAGNOSIS — K59.00 CONSTIPATION, UNSPECIFIED CONSTIPATION TYPE: Primary | ICD-10-CM

## 2025-07-14 DIAGNOSIS — K59.00 CONSTIPATION, UNSPECIFIED CONSTIPATION TYPE: ICD-10-CM

## 2025-07-14 PROCEDURE — 74018 RADEX ABDOMEN 1 VIEW: CPT | Performed by: RADIOLOGY

## 2025-07-14 PROCEDURE — 74018 RADEX ABDOMEN 1 VIEW: CPT

## 2025-07-14 NOTE — TELEPHONE ENCOUNTER
Having some constipation issues.  Mom tried suppositories, Miralax, and Milk of Magnesia.  Went the one day but not a significant amount.  Suppository 7/10 went a few hard round balls.  Then on 7/12 then the same .  Mom not sure what to do next.

## 2025-07-15 NOTE — PROGRESS NOTES
Virtual or Telephone Consent    While technically available, the patient was unable or unwilling to consent to connect via audio/video telehealth technology; therefore, I performed this visit using a real-time audio only connection between Eloise Costa & Ranjit Dumont MD.  Verbal consent was requested and obtained from her mother, Bouchra Costa on this date, 07/15/25, for a telehealth visit and the patient's location was confirmed at the time of the visit.    Patient's mother called today with concerns of ongoing constipation.  She has history of cerebral degeneration, seizures, non-verbal, and has chronic constipation for which she takes stool softener daily.  According to her mother, since July 2 her constipation has worsened, despite mother trying MiraLAX, milk of magnesia several suppositories formulations.  According to her, suppository on 7/10 and then repeated on 7/12 resulted in few hard round stool balls.  She reached out yesterday her primary care doctor, Dr. Pitts, who recommended continuing MiraLAX 1 cap per day and adding Dulcolax twice daily, as well as a KUB which was done today.  Reviewed KUB results showing nonobstructive bowel gas pattern, although the view was limited due to superimposed arm in the upper abdomen.  Mother states that since adding Dulcolax, she is having loose muddy brown stools, but she is worried upcoming  out of town trip this Friday, 7/18/25. She  denies any current distress, abdominal pain or N/V.  KUB results were discussed and patient mother was reassured, that her condition is starting to improve.   She should continue MiraLAX and Dulcolax as previously recommended, until she sees consistent watery bowel movement.  In the future we recommend continuing stool softener and adding MiraLAX daily to prevent worsening chronic constipation.  Also discussed that if this becomes recurrent/frequent she should follow-up with a GI doctor for further evaluation and  recommendations.  Patient's mother was made aware about worsening signs or symptoms of constipation including severe abdominal pain associated with nausea/vomiting and decreased oral intake.  If that is the case, she should visit the closest emergency department.      Ranjit Dumont MD  PGY3,  Resident  07/15/25

## 2025-07-16 ENCOUNTER — TELEPHONE (OUTPATIENT)
Facility: CLINIC | Age: 30
End: 2025-07-16
Payer: MEDICAID

## 2025-07-18 LAB
ATRIAL RATE: 94 BPM
P AXIS: 23 DEGREES
P OFFSET: 131 MS
P ONSET: 111 MS
PR INTERVAL: 368 MS
Q ONSET: 159 MS
QRS COUNT: 16 BEATS
QRS DURATION: 180 MS
QT INTERVAL: 454 MS
QTC CALCULATION(BAZETT): 567 MS
QTC FREDERICIA: 527 MS
R AXIS: 33 DEGREES
T AXIS: 27 DEGREES
T OFFSET: 386 MS
VENTRICULAR RATE: 94 BPM

## 2025-08-04 ENCOUNTER — HOSPITAL ENCOUNTER (OUTPATIENT)
Dept: CARDIOLOGY | Facility: HOSPITAL | Age: 30
Discharge: HOME | End: 2025-08-04
Payer: MEDICAID

## 2025-08-04 DIAGNOSIS — R56.9 SEIZURES (MULTI): ICD-10-CM

## 2025-08-04 DIAGNOSIS — R55 SYNCOPE AND COLLAPSE: ICD-10-CM

## 2025-08-04 LAB
AORTIC VALVE MEAN GRADIENT: 2 MMHG
AORTIC VALVE PEAK VELOCITY: 1.02 M/S
AV PEAK GRADIENT: 4 MMHG
AVA (PEAK VEL): 2.33 CM2
EJECTION FRACTION APICAL 4 CHAMBER: 64.9
EJECTION FRACTION: 65 %
LEFT ATRIUM VOLUME AREA LENGTH INDEX BSA: 17.6 ML/M2
LEFT VENTRICLE INTERNAL DIMENSION DIASTOLE: 2.83 CM (ref 3.5–6)
LEFT VENTRICULAR OUTFLOW TRACT DIAMETER: 1.83 CM
MITRAL VALVE E/A RATIO: 1.78
RIGHT VENTRICLE FREE WALL PEAK S': 12 CM/S
RIGHT VENTRICLE PEAK SYSTOLIC PRESSURE: 26 MMHG
TRICUSPID ANNULAR PLANE SYSTOLIC EXCURSION: 2.3 CM

## 2025-08-04 PROCEDURE — 93306 TTE W/DOPPLER COMPLETE: CPT

## 2025-08-04 PROCEDURE — 93246 EXT ECG>7D<15D RECORDING: CPT

## 2025-08-05 ENCOUNTER — RESULTS FOLLOW-UP (OUTPATIENT)
Dept: CARDIOLOGY | Facility: HOSPITAL | Age: 30
End: 2025-08-05
Payer: MEDICAID

## 2025-08-11 ENCOUNTER — TELEPHONE (OUTPATIENT)
Dept: CARDIOLOGY | Facility: CLINIC | Age: 30
End: 2025-08-11
Payer: MEDICAID

## 2025-08-11 ENCOUNTER — SPECIALTY PHARMACY (OUTPATIENT)
Dept: PHARMACY | Facility: CLINIC | Age: 30
End: 2025-08-11

## 2025-08-11 PROCEDURE — RXMED WILLOW AMBULATORY MEDICATION CHARGE

## 2025-08-12 ENCOUNTER — APPOINTMENT (OUTPATIENT)
Dept: NEUROLOGY | Facility: CLINIC | Age: 30
End: 2025-08-12
Payer: MEDICAID

## 2025-08-12 ENCOUNTER — PHARMACY VISIT (OUTPATIENT)
Dept: PHARMACY | Facility: CLINIC | Age: 30
End: 2025-08-12
Payer: MEDICAID

## 2025-08-13 ENCOUNTER — SPECIALTY PHARMACY (OUTPATIENT)
Dept: PHARMACY | Facility: CLINIC | Age: 30
End: 2025-08-13

## 2025-08-19 DIAGNOSIS — R56.9 SEIZURE (MULTI): Primary | ICD-10-CM

## (undated) DEVICE — DRAPE PACK, CRANIOTOMY, CUSTOM, UHC

## (undated) DEVICE — SUTURE, SILK, 2-0, 18 IN, BLACK

## (undated) DEVICE — DRAPE, ISOLATION, ANTIMICROBIAL, W/POUCH, IOBAN 2, STERI DRAPE, 125 X 83 IN, DISPOSABLE, STERILE

## (undated) DEVICE — Device

## (undated) DEVICE — DRESSING, TRANSPARENT, TEGADERM, 2-3/8 X 2-3/4 IN

## (undated) DEVICE — TOWEL, SURGICAL, NEURO, O/R, 16 X 26, BLUE, STERILE

## (undated) DEVICE — CORD, CAUTERY, BIOPOLAR FORCEP, 12FT

## (undated) DEVICE — GLOVE, SURGICAL, PROTEXIS PI BLUE W/NEUTHERA, 6.5, PF, LF

## (undated) DEVICE — DRILL, TWIST, AO, 3.2 X 195, SS, STERILE

## (undated) DEVICE — WOUND SYSTEM, DEBRIDEMENT & CLEANING, O.R DUOPAK

## (undated) DEVICE — DRAPE, TIBURON, SPLIT SHEET, REINF ADH STRIP, 77X122

## (undated) DEVICE — COVER, TABLE, SURGICAL, DISPOSABLE

## (undated) DEVICE — GLOVE, SURGICAL, PROTEXIS MICRO, 6.0, PF, LATEX

## (undated) DEVICE — PAD, GROUNDING, ELECTROSURGICAL, W/9 FT CABLE, POLYHESIVE II, ADULT, LF

## (undated) DEVICE — APPLICATOR, CHLORAPREP, W/ORANGE TINT, 26ML

## (undated) DEVICE — DRESSING, GAUZE, PETROLATUM, PATCH, XEROFORM, 1 X 8 IN, STERILE

## (undated) DEVICE — SPONGE, GAUZE, XRAY DECT, 16 PLY, 4 X 4, W/MASTER DMT,STERILE

## (undated) DEVICE — GOWN, SURGICAL, SMARTGOWN, LARGE, STERILE

## (undated) DEVICE — COVER HANDLE LIGHT, STERIS, BLUE, STERILE

## (undated) DEVICE — TRAY, SURESTEP, URINE METER, 16FR, COMPLETE, W/STATLOCK

## (undated) DEVICE — CATHETER, IV, ANGIOCATH, 14 G X 1.88 IN, FEP POLYMER

## (undated) DEVICE — SUTURE, VICRYL RAPIDE 4-0, PS-2, 3/8 CIRCLE, UNDYED, BRAIDED, 27"

## (undated) DEVICE — MARKER, SKIN, DUAL TIP INK W/9 LABEL AND REMOVABLE TIME OUT SLEEVE

## (undated) DEVICE — SUTURE, SILK, 2-0, 30 IN, SH, BLACK

## (undated) DEVICE — SYRINGE, 10 CC, LUER LOCK